# Patient Record
Sex: FEMALE | Race: WHITE | Employment: STUDENT | URBAN - METROPOLITAN AREA
[De-identification: names, ages, dates, MRNs, and addresses within clinical notes are randomized per-mention and may not be internally consistent; named-entity substitution may affect disease eponyms.]

---

## 2017-01-06 ENCOUNTER — ALLIED HEALTH/NURSE VISIT (OUTPATIENT)
Dept: NURSING | Facility: CLINIC | Age: 22
End: 2017-01-06
Payer: COMMERCIAL

## 2017-01-06 DIAGNOSIS — Z11.1 SCREENING EXAMINATION FOR PULMONARY TUBERCULOSIS: Primary | ICD-10-CM

## 2017-01-06 PROCEDURE — 86580 TB INTRADERMAL TEST: CPT

## 2017-01-06 NOTE — NURSING NOTE
Patient here for nurse only appointment for sumanth. Needs to be certified as an . She will return Monday for results. TALIA Forte LPN

## 2017-01-09 ENCOUNTER — ALLIED HEALTH/NURSE VISIT (OUTPATIENT)
Dept: NURSING | Facility: CLINIC | Age: 22
End: 2017-01-09
Payer: COMMERCIAL

## 2017-01-09 DIAGNOSIS — Z11.1 VISIT FOR MANTOUX TEST: Primary | ICD-10-CM

## 2017-01-09 LAB
PPDINDURATION: NORMAL MM (ref 0–5)
PPDREDNESS: NORMAL MM

## 2017-01-09 PROCEDURE — 99207 ZZC NO CHARGE NURSE ONLY: CPT

## 2017-06-22 ENCOUNTER — OFFICE VISIT (OUTPATIENT)
Dept: FAMILY MEDICINE | Facility: CLINIC | Age: 22
End: 2017-06-22
Payer: COMMERCIAL

## 2017-06-22 VITALS
DIASTOLIC BLOOD PRESSURE: 58 MMHG | SYSTOLIC BLOOD PRESSURE: 110 MMHG | HEART RATE: 57 BPM | BODY MASS INDEX: 20.16 KG/M2 | TEMPERATURE: 99 F | OXYGEN SATURATION: 98 % | WEIGHT: 121 LBS | HEIGHT: 65 IN

## 2017-06-22 DIAGNOSIS — Z02.9 ADMINISTRATIVE ENCOUNTER: Primary | ICD-10-CM

## 2017-06-22 DIAGNOSIS — Z23 NEED FOR VACCINATION: ICD-10-CM

## 2017-06-22 DIAGNOSIS — Z11.3 SCREEN FOR STD (SEXUALLY TRANSMITTED DISEASE): ICD-10-CM

## 2017-06-22 PROCEDURE — 36415 COLL VENOUS BLD VENIPUNCTURE: CPT | Performed by: INTERNAL MEDICINE

## 2017-06-22 PROCEDURE — 90471 IMMUNIZATION ADMIN: CPT | Performed by: INTERNAL MEDICINE

## 2017-06-22 PROCEDURE — 87491 CHLMYD TRACH DNA AMP PROBE: CPT | Performed by: INTERNAL MEDICINE

## 2017-06-22 PROCEDURE — 87389 HIV-1 AG W/HIV-1&-2 AB AG IA: CPT | Performed by: INTERNAL MEDICINE

## 2017-06-22 PROCEDURE — 90715 TDAP VACCINE 7 YRS/> IM: CPT | Performed by: INTERNAL MEDICINE

## 2017-06-22 PROCEDURE — 87591 N.GONORRHOEAE DNA AMP PROB: CPT | Performed by: INTERNAL MEDICINE

## 2017-06-22 PROCEDURE — 99212 OFFICE O/P EST SF 10 MIN: CPT | Mod: 25 | Performed by: INTERNAL MEDICINE

## 2017-06-22 NOTE — PROGRESS NOTES
"  SUBJECTIVE:                                                    Clakr Crawford is a 22 year old female who presents to clinic today for the following health issues:      Concern - Pt is here to have forms filled out; she is volunteering at a Camp this summer.      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  She needs physical form filled out.      Would also like to get STD screening and update Tdap.      She denies fevers, chills, headache, SOB, chest pain, GI concerns, joint problems, rashes.     Her anxiety is well controlled on sertraline 100mg daily. She sees a psychiatrist.     Reviewed and updated as needed this visit by clinical staff  Tobacco  Allergies  Meds  Med Hx  Surg Hx  Fam Hx  Soc Hx      Reviewed and updated as needed this visit by Provider  Tobacco  Allergies  Meds  Med Hx  Surg Hx  Fam Hx  Soc Hx        ROS:  Const, neuro, cv, pulm, gi, msk, and skin reviewed. Otherwise negative unless noted above.     OBJECTIVE:                                                    /58 (BP Location: Right arm, Patient Position: Chair, Cuff Size: Adult Regular)  Pulse 57  Temp 99  F (37.2  C) (Tympanic)  Ht 5' 4.5\" (1.638 m)  Wt 121 lb (54.9 kg)  LMP 06/08/2017  SpO2 98%  BMI 20.45 kg/m2  Body mass index is 20.45 kg/(m^2).    Gen: well appearing, pleasant young woman, no distress  HEENT: PERRL, sclera nonicteric, MMM  Neck: supple, no LAD  Pulm: breathing comfortably, CTAB, no wheezes or rales  CV: RRR, normal S1 and S2, no murmurs  Abd: BS present, soft, nontender, nondistended  Ext: 2+ distal pulses, no LE edema          ASSESSMENT/PLAN:                                                        1. Administrative encounter  Reviewed health history and patient current health. 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 Form filled out.     2. Screen for STD (sexually transmitted disease)  - NEISSERIA " GONORRHOEA PCR  - CHLAMYDIA TRACHOMATIS PCR  - HIV Antigen Antibody Combo    3. Need for vaccination  - TDAP VACCINE (ADACEL) [97934.002]  - 1st  Administration  [[id number]]    F/U as needed for persistent or worsening symptoms.       Caro Ríos MD  [facility]

## 2017-06-22 NOTE — MR AVS SNAPSHOT
"              After Visit Summary   6/22/2017    Clark Crawford    MRN: [id number]           Patient Information     Date Of Birth          1995        Visit Information        Provider Department      6/22/2017 11:00 AM Caro Ríos MD [facility] [place]        Today's Diagnoses     Administrative encounter    -  1    Screen for STD (sexually transmitted disease)        Need for vaccination           Follow-ups after your visit        Who to contact     If you have questions or need follow up information about today's clinic visit or your schedule please contact [facility] [place] directly at [phone].  Normal or non-critical lab and imaging results will be communicated to you by MyChart, letter or phone within 4 business days after the clinic has received the results. If you do not hear from us within 7 days, please contact the clinic through WeLikehart or phone. If you have a critical or abnormal lab result, we will notify you by phone as soon as possible.  Submit refill requests through [employer] or call your pharmacy and they will forward the refill request to us. Please allow 3 business days for your refill to be completed.          Additional Information About Your Visit        MyChart Information     [employer] gives you secure access to your electronic health record. If you see a primary care provider, you can also send messages to your care team and make appointments. If you have questions, please call your primary care clinic.  If you do not have a primary care provider, please call [phone] and they will assist you.        Care EveryWhere ID     This is your Care EveryWhere ID. This could be used by other organizations to access your [facility] medical records  [id number]        Your Vitals Were     Pulse Temperature Height Last Period Pulse Oximetry BMI (Body Mass Index)    57 99  F (37.2  C) (Tympanic) 5' 4.5\" (1.638 m) 06/08/2017 98% 20.45 kg/m2       Blood Pressure " from Last 3 Encounters:   06/22/17 110/58   11/23/16 108/57   08/15/16 100/66    Weight from Last 3 Encounters:   06/22/17 121 lb (54.9 kg)   11/23/16 118 lb 3.2 oz (53.6 kg)   08/10/16 116 lb 9.6 oz (52.9 kg)              We Performed the Following     1st  Administration  [[id number]]     CHLAMYDIA TRACHOMATIS PCR     HIV Antigen Antibody Combo     NEISSERIA GONORRHOEA PCR     TDAP VACCINE (ADACEL) [98381.002]          Today's Medication Changes          These changes are accurate as of: 6/22/17 11:44 AM.  If you have any questions, ask your nurse or doctor.               Stop taking these medicines if you haven't already. Please contact your care team if you have questions.     norgestim-eth estrad triphasic 0.18/0.215/0.25 MG-25 MCG per tablet   Commonly known as:  ORTHO TRI-CYCLEN LO   Stopped by:  Caro Ríos MD                    Primary Care Provider Office Phone # Fax #    Caro Ríos [phone]       [facility] [address] [place] [address] [address]  [place] [address]        Equal Access to Services     [employer] [facility] [facility]: Hadii aad ku hadasho Soomaali, waaxda luqadaha, qaybta kaalmada adeegyada, waxay idiin haycesarn odalis bird. So [facility] [phone].    ATENCIÓN: Si habla español, tiene a khan disposición servicios gratuitos de asistencia lingüística. Llame al [phone].    We comply with applicable federal civil rights laws and [place] laws. We do not discriminate on the basis of race, color, national origin, age, disability sex, sexual orientation or gender identity.            Thank you!     [facility]. Our goal is always to provide you with excellent care. Hearing back from our patients is one way we can continue to improve our services. Please take a few minutes to complete the written survey that you may receive in the mail after your visit with us. Thank you!             Your Updated Medication List -  Protect others around you: Learn how to safely use, store and throw away your medicines at www.disposemymeds.org.          This list is accurate as of: 6/22/17 11:44 AM.  Always use your most recent med list.                   Brand Name Dispense Instructions for use Diagnosis    ketoconazole 2 % cream    NIZORAL    60 g    Apply BID to affected area for 2-3 weeks    Tinea versicolor       levonorgestrel-ethinyl estradiol 0.1-20 MG-MCG per tablet    AVIANE,ALESSE,LESSINA    84 tablet    Take 1 tablet by mouth daily    Uses birth control       ZOLOFT PO      Take 100 mg by mouth daily

## 2017-06-22 NOTE — NURSING NOTE
"Chief Complaint   Patient presents with     Forms       Initial /58 (BP Location: Right arm, Patient Position: Chair, Cuff Size: Adult Regular)  Pulse 57  Temp 99  F (37.2  C) (Tympanic)  Ht 5' 4.5\" (1.638 m)  Wt 121 lb (54.9 kg)  LMP 06/08/2017  SpO2 98%  BMI 20.45 kg/m2 Estimated body mass index is 20.45 kg/(m^2) as calculated from the following:    Height as of this encounter: 5' 4.5\" (1.638 m).    Weight as of this encounter: 121 lb (54.9 kg).  Medication Reconciliation: complete  "

## 2017-06-23 LAB
C TRACH DNA SPEC QL NAA+PROBE: NORMAL
HIV 1+2 AB+HIV1 P24 AG SERPL QL IA: NORMAL
N GONORRHOEA DNA SPEC QL NAA+PROBE: NORMAL
SPECIMEN SOURCE: NORMAL
SPECIMEN SOURCE: NORMAL

## 2017-06-26 ENCOUNTER — ALLIED HEALTH/NURSE VISIT (OUTPATIENT)
Dept: NURSING | Facility: CLINIC | Age: 22
End: 2017-06-26
Payer: COMMERCIAL

## 2017-06-26 DIAGNOSIS — Z11.1 VISIT FOR MANTOUX TEST: Primary | ICD-10-CM

## 2017-06-26 PROCEDURE — 86580 TB INTRADERMAL TEST: CPT

## 2017-06-28 ENCOUNTER — ALLIED HEALTH/NURSE VISIT (OUTPATIENT)
Dept: NURSING | Facility: CLINIC | Age: 22
End: 2017-06-28
Payer: COMMERCIAL

## 2017-06-28 DIAGNOSIS — Z11.1 SCREENING EXAMINATION FOR PULMONARY TUBERCULOSIS: Primary | ICD-10-CM

## 2017-06-28 LAB
PPDINDURATION: 0 MM (ref 0–5)
PPDREDNESS: NORMAL MM

## 2017-06-28 PROCEDURE — 99207 ZZC NO CHARGE NURSE ONLY: CPT

## 2017-06-28 NOTE — PROGRESS NOTES
[place]ant[place] results NEGATIVE. No induration.  No swelling.  No redness.       Brittany Galeas, CMA

## 2017-06-28 NOTE — MR AVS SNAPSHOT
After Visit Summary   6/28/2017    Clark Crawford    MRN: [id number]           Patient Information     Date Of Birth          1995        Visit Information        Provider Department      6/28/2017 3:45 PM EC MA/LPN [facility] [place]        Today's Diagnoses     Screening examination for pulmonary tuberculosis    -  1       Follow-ups after your visit        Who to contact     If you have questions or need follow up information about today's clinic visit or your schedule please contact [facility] [place] directly at [phone].  Normal or non-critical lab and imaging results will be communicated to you by Wanderablehart, letter or phone within 4 business days after the clinic has received the results. If you do not hear from us within 7 days, please contact the clinic through [employer] or phone. If you have a critical or abnormal lab result, we will notify you by phone as soon as possible.  Submit refill requests through [employer] or call your pharmacy and they will forward the refill request to us. Please allow 3 business days for your refill to be completed.          Additional Information About Your Visit        MyChart Information     [employer] gives you secure access to your electronic health record. If you see a primary care provider, you can also send messages to your care team and make appointments. If you have questions, please call your primary care clinic.  If you do not have a primary care provider, please call [phone] and they will assist you.        Care EveryWhere ID     This is your Care EveryWhere ID. This could be used by other organizations to access your [facility] medical records  [id number]        Your Vitals Were     Last Period                   06/08/2017            Blood Pressure from Last 3 Encounters:   06/22/17 110/58   11/23/16 108/57   08/15/16 100/66    Weight from Last 3 Encounters:   06/22/17 121 lb (54.9 kg)   11/23/16 118 lb 3.2 oz (53.6 kg)    08/10/16 116 lb 9.6 oz (52.9 kg)              Today, you had the following     No orders found for display       Primary Care Provider Office Phone # Fax #    Caro Ríos [phone]       [facility] [address] [place] [address] DR  [place] [address]        Equal Access to Services     [facility]: Hadii aad ku hadasho Soomaali, waaxda luqadaha, qaybta kaalmada adeegyada, waxay idiin hayaan adeeg kharash la'aan ah. So [facility] [phone].    ATENCIÓN: Si habla español, tiene a khan disposición servicios gratuitos de asistencia lingüística. Llame al [phone].    We comply with applicable federal civil rights laws and [place] laws. We do not discriminate on the basis of race, color, national origin, age, disability sex, sexual orientation or gender identity.            Thank you!     Thank you for choosing [facility] [place]  for your care. Our goal is always to provide you with excellent care. Hearing back from our patients is one way we can continue to improve our services. Please take a few minutes to complete the written survey that you may receive in the mail after your visit with us. Thank you!             Your Updated Medication List - Protect others around you: Learn how to safely use, store and throw away your medicines at www.disposemymeds.org.          This list is accurate as of: 6/28/17  4:37 PM.  Always use your most recent med list.                   Brand Name Dispense Instructions for use Diagnosis    ketoconazole 2 % cream    NIZORAL    60 g    Apply BID to affected area for 2-3 weeks    Tinea versicolor       levonorgestrel-ethinyl estradiol 0.1-20 MG-MCG per tablet    AVIANE,ALESSE,LESSINA    84 tablet    Take 1 tablet by mouth daily    Uses birth control       ZOLOFT PO      Take 100 mg by mouth daily

## 2017-07-05 ENCOUNTER — TELEPHONE (OUTPATIENT)
Dept: FAMILY MEDICINE | Facility: CLINIC | Age: 22
End: 2017-07-05

## 2017-07-05 NOTE — TELEPHONE ENCOUNTER
Patients mother walked into clinic and was given copy of immunization record by  staff.   Lydia Anderson RN   [facility] - Triage

## 2017-07-05 NOTE — TELEPHONE ENCOUNTER
Reason for Call:  Other LETTER    Detailed comments: PT NEEDS LETTER STATING THAT SHE HAS HAD THE CHICKEN PX AND DOES NOT NEED THE VACCINATION. PT WILL  LATER THIS AFTERNOON.    Phone Number Patient can be reached at: Home number on file [phone] (home)    Best Time: ANYTIME    Can we leave a detailed message on this number? YES    Call taken on 7/5/2017 at 11:06 AM by Denisha Platt

## 2017-07-05 NOTE — TELEPHONE ENCOUNTER
Left non detailed message for patient to return call. What does patient need this for? Unfortunately, per chart review it only states that she has had the chicken pox but she was not a patient with [facility] when she had them so we do not know when. If she needs this documentation for school she will likely either need titers drawn or to be re-immunized.   Lydia Anderson RN   [facility] - Triage

## 2017-10-05 DIAGNOSIS — B36.0 TINEA VERSICOLOR: ICD-10-CM

## 2017-10-05 NOTE — TELEPHONE ENCOUNTER
Ketoconazole (NIZOLRAL) 2 % cream      Last Written Prescription Date: 11/23/2016  Last Fill Quantity: 60 g,  # refills: 2   Last Office Visit with [facility], [facility] or [facility] prescribing provider: 06/22/2017    Taylor Muñoz MA

## 2017-10-06 RX ORDER — KETOCONAZOLE 20 MG/G
CREAM TOPICAL
Qty: 60 G | Refills: 2 | Status: SHIPPED | OUTPATIENT
Start: 2017-10-06 | End: 2018-04-19

## 2017-10-06 NOTE — TELEPHONE ENCOUNTER
Routing refill request to provider for review/approval because:  Medication ordered for 2-3 weeks.  Please advise if to continue with cream  Katie Enriquez RN - Triage  [facility]

## 2017-11-28 DIAGNOSIS — Z78.9 USES BIRTH CONTROL: ICD-10-CM

## 2017-11-29 RX ORDER — LEVONORGESTREL AND ETHINYL ESTRADIOL 0.1-0.02MG
KIT ORAL
Qty: 84 TABLET | Refills: 1 | Status: SHIPPED | OUTPATIENT
Start: 2017-11-29 | End: 2018-04-25

## 2017-11-29 NOTE — TELEPHONE ENCOUNTER
Birth control      Last Written Prescription Date: 12/20/16  Last Fill Quantity: 84,  # refills: 3   [facility] Office Visit with [facility] or [facility] prescribing provider: 6/22/17                                           Requested Prescriptions   Pending Prescriptions Disp Refills     FALMINA 0.1-20 MG-MCG per tablet [Pharmacy Med Name: FALMINA 0.1-20MG-MCG TABS] 84 tablet 3     Sig: TAKE ONE TABLET BY MOUTH EVERY DAY    Contraceptives Protocol Passed    11/28/2017  4:45 PM       Passed - Patient is not a current smoker if age is 35 or older       Passed - Recent or future visit with authorizing provider's specialty    Patient had office visit in the last year or has a visit in the next 30 days with authorizing provider.  See chart review.              Passed - No active pregnancy on record       Passed - No positive pregnancy test in past 12 months        PHQ-9 SCORE 10/14/2009   Total Score 1     No flowsheet data found.

## 2017-12-07 ENCOUNTER — TELEPHONE (OUTPATIENT)
Dept: FAMILY MEDICINE | Facility: CLINIC | Age: 22
End: 2017-12-07

## 2017-12-07 NOTE — TELEPHONE ENCOUNTER
Reason for Call:  Other appointment    Detailed comments: Pt would like to schedule a consult & IUD appt at [facility]    Phone Number Patient can be reached at: Cell number on file:    Telephone Information:   Mobile [phone]       Best Time: any    Can we leave a detailed message on this number? YES    Call taken on 12/7/2017 at 3:56 PM by Lillie Cheney

## 2017-12-14 ENCOUNTER — OFFICE VISIT (OUTPATIENT)
Dept: FAMILY MEDICINE | Facility: CLINIC | Age: 22
End: 2017-12-14
Payer: COMMERCIAL

## 2017-12-14 VITALS
TEMPERATURE: 98.8 F | WEIGHT: 123 LBS | BODY MASS INDEX: 20.49 KG/M2 | HEART RATE: 66 BPM | HEIGHT: 65 IN | OXYGEN SATURATION: 98 % | SYSTOLIC BLOOD PRESSURE: 100 MMHG | DIASTOLIC BLOOD PRESSURE: 66 MMHG

## 2017-12-14 DIAGNOSIS — Z23 NEED FOR PROPHYLACTIC VACCINATION AND INOCULATION AGAINST INFLUENZA: ICD-10-CM

## 2017-12-14 DIAGNOSIS — Z30.09 BIRTH CONTROL COUNSELING: Primary | ICD-10-CM

## 2017-12-14 PROCEDURE — 87491 CHLMYD TRACH DNA AMP PROBE: CPT | Performed by: FAMILY MEDICINE

## 2017-12-14 PROCEDURE — 87591 N.GONORRHOEAE DNA AMP PROB: CPT | Performed by: FAMILY MEDICINE

## 2017-12-14 PROCEDURE — 99214 OFFICE O/P EST MOD 30 MIN: CPT | Performed by: FAMILY MEDICINE

## 2017-12-14 NOTE — NURSING NOTE
"Chief Complaint   Patient presents with     IUD Consult       Initial /66 (Cuff Size: Adult Regular)  Pulse 66  Temp 98.8  F (37.1  C) (Tympanic)  Ht 5' 4.5\" (1.638 m)  Wt 123 lb (55.8 kg)  LMP 11/20/2017 (Approximate)  SpO2 98%  BMI 20.79 kg/m2 Estimated body mass index is 20.79 kg/(m^2) as calculated from the following:    Height as of this encounter: 5' 4.5\" (1.638 m).    Weight as of this encounter: 123 lb (55.8 kg).  Medication Reconciliation: complete   Yoselyn Cruz, CMA    "

## 2017-12-14 NOTE — MR AVS SNAPSHOT
After Visit Summary   12/14/2017    Clark Crawford    MRN: [id number]           Patient Information     Date Of Birth          1995        Visit Information        Provider Department      12/14/2017 10:40 AM Jonathan Pruitt MD [facility] [place]        Today's Diagnoses     Birth control counseling    -  1    Need for prophylactic vaccination and inoculation against influenza           Follow-ups after your visit        Follow-up notes from your care team     Return in about 1 week (around 12/21/2017) for IUD placement .      Who to contact     If you have questions or need follow up information about today's clinic visit or your schedule please contact [facility] directly at [phone].  Normal or non-critical lab and imaging results will be communicated to you by SmartMenuCardhart, letter or phone within 4 business days after the clinic has received the results. If you do not hear from us within 7 days, please contact the clinic through SmartMenuCardhart or phone. If you have a critical or abnormal lab result, we will notify you by phone as soon as possible.  Submit refill requests through [employer] or call your pharmacy and they will forward the refill request to us. Please allow 3 business days for your refill to be completed.          Additional Information About Your Visit        MyChart Information     [employer] gives you secure access to your electronic health record. If you see a primary care provider, you can also send messages to your care team and make appointments. If you have questions, please call your primary care clinic.  If you do not have a primary care provider, please call [phone] and they will assist you.        Care EveryWhere ID     This is your Care EveryWhere ID. This could be used by other organizations to access your [facility] medical records  [id number]        Your Vitals Were     Pulse Temperature Height Last Period Pulse Oximetry BMI (Body Mass Index)    66  "98.8  F (37.1  C) (Tympanic) 5' 4.5\" (1.638 m) 11/20/2017 (Approximate) 98% 20.79 kg/m2       Blood Pressure from Last 3 Encounters:   12/14/17 100/66   06/22/17 110/58   11/23/16 108/57    Weight from Last 3 Encounters:   12/14/17 123 lb (55.8 kg)   06/22/17 121 lb (54.9 kg)   11/23/16 118 lb 3.2 oz (53.6 kg)              We Performed the Following     Chlamydia trachomatis PCR     Neisseria gonorrhoeae PCR        Primary Care Provider Office Phone # Fax #    Caro Ríos -[phone]       [address]  [place] [address]        Equal Access to Services     [facility]: Hadii nabor vazquez hadasho Soomaali, waaxda luqadaha, qaybta kaalmada adeegyada, yesenia ordaz . So [facility] [phone].    ATENCIÓN: Si habla español, tiene a khan disposición servicios gratuitos de asistencia lingüística. [facility] [phone].    We comply with applicable federal civil rights laws and [place] laws. We do not discriminate on the basis of race, color, national origin, age, disability, sex, sexual orientation, or gender identity.            Thank you!     Thank you for choosing [facility] [place]  for your care. Our goal is always to provide you with excellent care. Hearing back from our patients is one way we can continue to improve our services. Please take a few minutes to complete the written survey that you may receive in the mail after your visit with us. Thank you!             Your Updated Medication List - Protect others around you: Learn how to safely use, store and throw away your medicines at www.disposemymeds.org.          This list is accurate as of: 12/14/17 11:42 AM.  Always use your most recent med list.                   Brand Name Dispense Instructions for use Diagnosis    FALMINA 0.1-20 MG-MCG per tablet   Generic drug:  levonorgestrel-ethinyl estradiol     84 tablet    TAKE ONE TABLET BY MOUTH EVERY DAY    Uses birth control       ketoconazole 2 " % cream    NIZORAL    60 g    APPLY TO AFFECTED AREA(S) TWO TIMES A DAY FOR 2 TO 3 WEEKS    Tinea versicolor       ZOLOFT PO      Take 100 mg by mouth daily

## 2017-12-14 NOTE — PROGRESS NOTES
SUBJECTIVE:   Clark Crawford is a 22 year old female who presents to clinic today for the following health issues:      IUD Consult       Description (location/character/radiation): IUD Consult         Problem list and histories reviewed & adjusted, as indicated.  Additional history: as documented    Patient Active Problem List   Diagnosis     ZEB (generalized anxiety disorder)     Eating disorder     CARDIOVASCULAR SCREENING; LDL GOAL LESS THAN 160     Past Surgical History:   Procedure Laterality Date     NO HISTORY OF SURGERY         Social History   Substance Use Topics     Smoking status: Never Smoker     Smokeless tobacco: Never Used     Alcohol use 1.8 oz/week     3 Standard drinks or equivalent per week      Comment: socially     Family History   Problem Relation Age of Onset     Anesthesia Reaction Mother      nausea     Pancreatic Cancer Mother 47     alive     Anesthesia Reaction Maternal Grandmother      nausea     Depression Maternal Grandmother      past, not currently     Breast Cancer No family hx of      Colon Cancer No family hx of          Current Outpatient Prescriptions   Medication Sig Dispense Refill     ketoconazole (NIZORAL) 2 % cream APPLY TO AFFECTED AREA(S) TWO TIMES A DAY FOR 2 TO 3 WEEKS 60 g 2     Sertraline HCl (ZOLOFT PO) Take 100 mg by mouth daily       FALMINA 0.1-20 MG-MCG per tablet TAKE ONE TABLET BY MOUTH EVERY DAY (Patient not taking: Reported on 12/14/2017) 84 tablet 1     No Known Allergies  Recent Labs   Lab Test  08/10/16   1356   ALT  20   CR  0.73   GFRESTIMATED  >90  Non  GFR Calc     GFRESTBLACK  >90   GFR Calc     POTASSIUM  3.8      BP Readings from Last 3 Encounters:   12/14/17 100/66   06/22/17 110/58   11/23/16 108/57    Wt Readings from Last 3 Encounters:   12/14/17 123 lb (55.8 kg)   06/22/17 121 lb (54.9 kg)   11/23/16 118 lb 3.2 oz (53.6 kg)                   Reviewed and updated as needed this visit by clinical staff    "  Reviewed and updated as needed this visit by Provider         ROS:  Constitutional, HEENT, cardiovascular, pulmonary, gi and gu systems are negative, except as otherwise noted.      OBJECTIVE:   /66 (Cuff Size: Adult Regular)  Pulse 66  Temp 98.8  F (37.1  C) (Tympanic)  Ht 5' 4.5\" (1.638 m)  Wt 123 lb (55.8 kg)  LMP 11/20/2017 (Approximate)  SpO2 98%  BMI 20.79 kg/m2  Body mass index is 20.79 kg/(m^2).  GENERAL: healthy, alert and no distress  NECK: no adenopathy, no asymmetry, masses, or scars and thyroid normal to palpation  RESP: lungs clear to auscultation - no rales, rhonchi or wheezes  CV: regular rate and rhythm, normal S1 S2, no S3 or S4, no murmur, click or rub, no peripheral edema and peripheral pulses strong  ABDOMEN: soft, nontender, no hepatosplenomegaly, no masses and bowel sounds normal  MS: no gross musculoskeletal defects noted, no edema        ASSESSMENT/PLAN:   Clark was seen today for iud consult.    Diagnoses and all orders for this visit:    Birth control counseling  -     Neisseria gonorrhoeae PCR  -     Chlamydia trachomatis PCR    Need for prophylactic vaccination and inoculation against influenza      Has extended conversation about birth control device including IUD and implant, reviewed through advantage and disadvantage of all devices, pt elected Mirena,   Will have her to schedule IUD procedure next week after GC/Chlam test results returned     A total time of 27 minutes was spent with the patient today. Of this time More than 50% was spent counseling and coordinating of care of the above concerns.    FUTURE APPOINTMENTS:       - Follow-up visit in 1 week for IUD placement     Jonathan Pruitt MD  [facility]  "

## 2017-12-15 LAB
C TRACH DNA SPEC QL NAA+PROBE: NEGATIVE
N GONORRHOEA DNA SPEC QL NAA+PROBE: NEGATIVE
SPECIMEN SOURCE: NORMAL
SPECIMEN SOURCE: NORMAL

## 2017-12-22 ENCOUNTER — OFFICE VISIT (OUTPATIENT)
Dept: FAMILY MEDICINE | Facility: CLINIC | Age: 22
End: 2017-12-22
Payer: COMMERCIAL

## 2017-12-22 VITALS
WEIGHT: 120 LBS | HEART RATE: 84 BPM | SYSTOLIC BLOOD PRESSURE: 92 MMHG | DIASTOLIC BLOOD PRESSURE: 60 MMHG | BODY MASS INDEX: 20.28 KG/M2 | TEMPERATURE: 97.6 F

## 2017-12-22 DIAGNOSIS — Z30.430 ENCOUNTER FOR INSERTION OF INTRAUTERINE CONTRACEPTIVE DEVICE (IUD): ICD-10-CM

## 2017-12-22 DIAGNOSIS — Z30.430 ENCOUNTER FOR IUD INSERTION: Primary | ICD-10-CM

## 2017-12-22 LAB — BETA HCG QUAL IFA URINE: NEGATIVE

## 2017-12-22 PROCEDURE — 84703 CHORIONIC GONADOTROPIN ASSAY: CPT | Performed by: FAMILY MEDICINE

## 2017-12-22 PROCEDURE — 58300 INSERT INTRAUTERINE DEVICE: CPT | Performed by: FAMILY MEDICINE

## 2017-12-22 NOTE — PROGRESS NOTES
SUBJECTIVE:   Clark Crawford is a 22 year old female who presents to clinic today for the following health issues:      Concern - Pt is here to have an IUD placed.     Problem list and histories reviewed & adjusted, as indicated.  Additional history: as documented    Patient Active Problem List   Diagnosis     ZEB (generalized anxiety disorder)     Eating disorder     CARDIOVASCULAR SCREENING; LDL GOAL LESS THAN 160     Past Surgical History:   Procedure Laterality Date     NO HISTORY OF SURGERY         Social History   Substance Use Topics     Smoking status: Never Smoker     Smokeless tobacco: Never Used     Alcohol use 1.8 oz/week     3 Standard drinks or equivalent per week      Comment: socially     Family History   Problem Relation Age of Onset     Anesthesia Reaction Mother      nausea     Pancreatic Cancer Mother 47     alive     Anesthesia Reaction Maternal Grandmother      nausea     Depression Maternal Grandmother      past, not currently     Breast Cancer No family hx of      Colon Cancer No family hx of          Current Outpatient Prescriptions   Medication Sig Dispense Refill     levonorgestrel (MIRENA) 20 MCG/24HR IUD 1 each (20 mcg) by Intrauterine route once for 1 dose 1 each 0     ketoconazole (NIZORAL) 2 % cream APPLY TO AFFECTED AREA(S) TWO TIMES A DAY FOR 2 TO 3 WEEKS 60 g 2     Sertraline HCl (ZOLOFT PO) Take 100 mg by mouth daily       FALMINA 0.1-20 MG-MCG per tablet TAKE ONE TABLET BY MOUTH EVERY DAY (Patient not taking: Reported on 12/14/2017) 84 tablet 1     No Known Allergies  Recent Labs   Lab Test  08/10/16   1356   ALT  20   CR  0.73   GFRESTIMATED  >90  Non  GFR Calc     GFRESTBLACK  >90   GFR Calc     POTASSIUM  3.8      BP Readings from Last 3 Encounters:   12/22/17 92/60   12/14/17 100/66   06/22/17 110/58    Wt Readings from Last 3 Encounters:   12/22/17 120 lb (54.4 kg)   12/14/17 123 lb (55.8 kg)   06/22/17 121 lb (54.9 kg)              Reviewed  and updated as needed this visit by clinical staff     Reviewed and updated as needed this visit by Provider         ROS:  Constitutional, HEENT, cardiovascular, pulmonary, gi and gu systems are negative, except as otherwise noted.      OBJECTIVE:   BP 92/60  Pulse 84  Temp 97.6  F (36.4  C) (Tympanic)  Wt 120 lb (54.4 kg)  LMP 12/16/2017  BMI 20.28 kg/m2  Body mass index is 20.28 kg/(m^2).  GENERAL: healthy, alert and no distress  NECK: no adenopathy, no asymmetry, masses, or scars and thyroid normal to palpation  RESP: lungs clear to auscultation - no rales, rhonchi or wheezes  CV: regular rate and rhythm, normal S1 S2, no S3 or S4, no murmur, click or rub, no peripheral edema and peripheral pulses strong  ABDOMEN: soft, nontender, no hepatosplenomegaly, no masses and bowel sounds normal  MS: no gross musculoskeletal defects noted, no edema        ASSESSMENT/PLAN:     Clark was seen today for contraception.    Diagnoses and all orders for this visit:    Encounter for IUD insertion  -     levonorgestrel (MIRENA) 20 MCG/24HR IUD; 1 each (20 mcg) by Intrauterine route once for 1 dose  -     INSERTION INTRAUTERINE DEVICE  -     Mirena    Encounter for insertion of intrauterine contraceptive device (IUD)    Other orders  -     Cancel: HCG tumor marker  -     Beta HCG qual IFA urine - [facility] and [facility]        IUD insertion note    Clark Crawford is a 22 year old female who presents today requesting placement of an intrauterine device.  PROCEDURE:  Type of IUD: Mirena  The patient has taken 600-800mg of Ibuprofen prior to the procedure.  She is placed in a dorsal lithotomy potion and a pelvic exam is performed to determine the position of the uterus.  The cervix is identified and cleaned with betadine three times.  A single tooth tenaculum is applied to the anterior lip of the cervix for stabilization.  The uterus is sounded to 6.0 cm and removed. (Target sound depth is 6.5 cm to 8.5 cm.)  The IUD insertion tube is  prepared to manufacturers recommendations and inserted into the uterus under sterile conditions to the sounding depth.   The arms of the IUD are then opened by withdrawing the insertion tube 2.0 cm while stabilizing the solid insertion edgar without difficulty.  The IUD string is then cut to 2.0 cm.    The patient tolerated this procedure without immediate complication.  The patient is to return or call immediately for any unexplained fever, abdominal or pelvic pain, excessive bleeding, possibility of pregnancy, foul-smelling discharge, sense that the IUD has been expelled.    Jonathan Pruitt

## 2017-12-22 NOTE — MR AVS SNAPSHOT
After Visit Summary   12/22/2017    Clark Crawford    MRN: [id number]           Patient Information     Date Of Birth          1995        Visit Information        Provider Department      12/22/2017 12:00 PM Jonathan Pruitt MD [facility] [place]        Today's Diagnoses     Encounter for IUD insertion    -  1    Encounter for insertion of intrauterine contraceptive device (IUD)           Follow-ups after your visit        Who to contact     If you have questions or need follow up information about today's clinic visit or your schedule please contact [facility] [place] directly at [phone].  Normal or non-critical lab and imaging results will be communicated to you by MyChart, letter or phone within 4 business days after the clinic has received the results. If you do not hear from us within 7 days, please contact the clinic through Kidblog[employer] or phone. If you have a critical or abnormal lab result, we will notify you by phone as soon as possible.  Submit refill requests through [employer]Te[employer] or call your pharmacy and they will forward the refill request to us. Please allow 3 business days for your refill to be completed.          Additional Information About Your Visit        MyChart Information     PurpleTeal gives you secure access to your electronic health record. If you see a primary care provider, you can also send messages to your care team and make appointments. If you have questions, please call your primary care clinic.  If you do not have a primary care provider, please call [phone] and they will assist you.        Care EveryWhere ID     This is your Care EveryWhere ID. This could be used by other organizations to access your [place] medical records  [id number]        Your Vitals Were     Pulse Temperature Last Period BMI (Body Mass Index)          84 97.6  F (36.4  C) (Tympanic) 12/16/2017 20.28 kg/m2         Blood Pressure from Last 3 Encounters:   12/22/17 92/60    12/14/17 100/66   06/22/17 110/58    Weight from Last 3 Encounters:   12/22/17 120 lb (54.4 kg)   12/14/17 123 lb (55.8 kg)   06/22/17 121 lb (54.9 kg)              We Performed the Following     Beta HCG qual IFA urine - [facility] and [facility]     INSERTION INTRAUTERINE DEVICE     Mirena          Today's Medication Changes          These changes are accurate as of: 12/22/17  1:49 PM.  If you have any questions, ask your nurse or doctor.               Start taking these medicines.        Dose/Directions    levonorgestrel 20 MCG/24HR IUD   Commonly known as:  MIRENA   Used for:  Encounter for IUD insertion   Started by:  Jonathan Pruitt MD        Dose:  1 each   1 each (20 mcg) by Intrauterine route once for 1 dose   Quantity:  1 each   Refills:  0            Where to get your medicines      Some of these will need a paper prescription and others can be bought over the counter.  Ask your nurse if you have questions.     You don't need a prescription for these medications     levonorgestrel 20 MCG/24HR IUD                Primary Care Provider Office Phone # Fax #    Caro Ríos [phone]       [address]  [place] [address]        Equal Access to Services     [facility]: Hadii aad ku hadasho Soomaali, waaxda luqadaha, qaybta kaalmada adeegyada, waxay tdin haycesarn odalis bird. So [facility] [phone].    ATENCIÓN: Si habla español, tiene a khan disposición servicios gratuitos de asistencia lingüística. Llame al [phone].    We comply with applicable federal civil rights laws and [place] laws. We do not discriminate on the basis of race, color, national origin, age, disability, sex, sexual orientation, or gender identity.            Thank you!     Thank you for choosing [facility] [place]  for your care. Our goal is always to provide you with excellent care. Hearing back from our patients is one way we can continue to improve our services. Please take a few  minutes to complete the written survey that you may receive in the mail after your visit with us. Thank you!             Your Updated Medication List - Protect others around you: Learn how to safely use, store and throw away your medicines at www.disposemymeds.org.          This list is accurate as of: 12/22/17  1:49 PM.  Always use your most recent med list.                   Brand Name Dispense Instructions for use Diagnosis    FALMINA 0.1-20 MG-MCG per tablet   Generic drug:  levonorgestrel-ethinyl estradiol     84 tablet    TAKE ONE TABLET BY MOUTH EVERY DAY    Uses birth control       ketoconazole 2 % cream    NIZORAL    60 g    APPLY TO AFFECTED AREA(S) TWO TIMES A DAY FOR 2 TO 3 WEEKS    Tinea versicolor       levonorgestrel 20 MCG/24HR IUD    MIRENA    1 each    1 each (20 mcg) by Intrauterine route once for 1 dose    Encounter for IUD insertion       ZOLOFT PO      Take 100 mg by mouth daily

## 2018-04-19 DIAGNOSIS — B36.0 TINEA VERSICOLOR: ICD-10-CM

## 2018-04-19 NOTE — TELEPHONE ENCOUNTER
"Requested Prescriptions   Pending Prescriptions Disp Refills     ketoconazole (NIZORAL) 2 % cream [Pharmacy Med Name: KETOCONAZOLE 2% CREA] 60 g 2    Last Written Prescription Date:  10/6/17  Last Fill Quantity: 60 g,  # refills: 2   Last office visit: 12/22/2017 with prescribing provider:  Dr. Pruitt   Future Office Visit:     Sig: APPLY TO AFFECTED AREA(S) TWO TIMES A DAY FOR 2 TO 3 WEEKS    Antifungal Agents Passed    4/19/2018  3:29 PM       Passed - Recent (12 mo) or future (30 days) visit within the authorizing provider's specialty    Patient had office visit in the last 12 months or has a visit in the next 30 days with authorizing provider or within the authorizing provider's specialty.  See \"Patient Info\" tab in inbasket, or \"Choose Columns\" in Meds & Orders section of the refill encounter.           Passed - Not Fluconazole or Terconazole     If oral Fluconazole or Terconazole, may refill if indicated in progress notes.           Routing refill request to provider for review/approval because:  Ordered for 2-3 weeks in October, advise if should continue medication.   Kira Powers RN   [place] Triage             "

## 2018-04-20 RX ORDER — KETOCONAZOLE 20 MG/G
CREAM TOPICAL
Qty: 60 G | Refills: 2 | Status: SHIPPED | OUTPATIENT
Start: 2018-04-20 | End: 2021-04-06

## 2018-04-24 ENCOUNTER — TELEPHONE (OUTPATIENT)
Dept: FAMILY MEDICINE | Facility: CLINIC | Age: 23
End: 2018-04-24

## 2018-04-24 NOTE — TELEPHONE ENCOUNTER
Patient complaining about pain above her hip  Patient is a runner and has had pain for a few days that is getting worse and making it hard to walk  No injury that she is aware of  Has always had tight hips  States that the other day she woke up and went on a run  Pain in her hip after run was worse than usual  Took a few days off running and then Sunday she went on a run again  Seemed OK for the first 1/2 hour or so but then got wore  Pain is no longer just in the hip, but is also in the muscle above the glut  Walking with a limp now.  I did recommend that patient be seen at the walk in ortho clinic at the [facility] for her current problem  Patient agrees with plan.  Will check with her insurance to determine if covered.    Patient had been following at [facility] and then moved out of state to go to college.  Mother moved to [place] and patient was seen at [facility] when she came home from school   Would like to resume care at [facility].  Will schedule appointment here when next due for Office visit.  Dee Moody RN

## 2018-04-25 ENCOUNTER — OFFICE VISIT (OUTPATIENT)
Dept: FAMILY MEDICINE | Facility: CLINIC | Age: 23
End: 2018-04-25
Payer: COMMERCIAL

## 2018-04-25 ENCOUNTER — OFFICE VISIT (OUTPATIENT)
Dept: ORTHOPEDICS | Facility: CLINIC | Age: 23
End: 2018-04-25

## 2018-04-25 ENCOUNTER — TELEPHONE (OUTPATIENT)
Dept: FAMILY MEDICINE | Facility: CLINIC | Age: 23
End: 2018-04-25

## 2018-04-25 VITALS
DIASTOLIC BLOOD PRESSURE: 55 MMHG | HEART RATE: 66 BPM | WEIGHT: 123 LBS | SYSTOLIC BLOOD PRESSURE: 92 MMHG | BODY MASS INDEX: 20.49 KG/M2 | HEIGHT: 65 IN

## 2018-04-25 VITALS
DIASTOLIC BLOOD PRESSURE: 55 MMHG | HEIGHT: 65 IN | OXYGEN SATURATION: 98 % | BODY MASS INDEX: 20.49 KG/M2 | WEIGHT: 123 LBS | TEMPERATURE: 99.1 F | HEART RATE: 66 BPM | RESPIRATION RATE: 14 BRPM | SYSTOLIC BLOOD PRESSURE: 92 MMHG

## 2018-04-25 DIAGNOSIS — N92.1 MENORRHAGIA WITH IRREGULAR CYCLE: Primary | ICD-10-CM

## 2018-04-25 DIAGNOSIS — M53.3 SACROILIAC JOINT DYSFUNCTION: Primary | ICD-10-CM

## 2018-04-25 DIAGNOSIS — Z97.5 IUD (INTRAUTERINE DEVICE) IN PLACE: ICD-10-CM

## 2018-04-25 PROCEDURE — 99213 OFFICE O/P EST LOW 20 MIN: CPT | Performed by: FAMILY MEDICINE

## 2018-04-25 ASSESSMENT — ANXIETY QUESTIONNAIRES
IF YOU CHECKED OFF ANY PROBLEMS ON THIS QUESTIONNAIRE, HOW DIFFICULT HAVE THESE PROBLEMS MADE IT FOR YOU TO DO YOUR WORK, TAKE CARE OF THINGS AT HOME, OR GET ALONG WITH OTHER PEOPLE: NOT DIFFICULT AT ALL
3. WORRYING TOO MUCH ABOUT DIFFERENT THINGS: NOT AT ALL
GAD7 TOTAL SCORE: 3
2. NOT BEING ABLE TO STOP OR CONTROL WORRYING: NOT AT ALL
5. BEING SO RESTLESS THAT IT IS HARD TO SIT STILL: NOT AT ALL
1. FEELING NERVOUS, ANXIOUS, OR ON EDGE: SEVERAL DAYS
6. BECOMING EASILY ANNOYED OR IRRITABLE: NOT AT ALL
7. FEELING AFRAID AS IF SOMETHING AWFUL MIGHT HAPPEN: SEVERAL DAYS

## 2018-04-25 ASSESSMENT — PATIENT HEALTH QUESTIONNAIRE - PHQ9: 5. POOR APPETITE OR OVEREATING: SEVERAL DAYS

## 2018-04-25 NOTE — LETTER
"4/25/2018       RE: Clark Crawford  [address] [place] [address]     Dear Colleague,    Thank you for referring your patient, Clark Crawford, to the [facility]TH[facility]ALK [facility] CLINIC at [facility]. Please see a copy of my visit note below.          SPORTS & ORTHOPEDIC WALK-IN VISIT 4/25/2018    Primary Care Physician: Dr. Ríos  Woke up last week Thursday. Woke up with \"tightness\". Does run every other day. Took a few days off running, started to feel better. Runs about 29 miles a week  Reason for visit:     What part of your body is injured / painful?  right hip/ SI joint    What caused the injury /pain? No inciting event     How long ago did your injury occur or pain begin? several days ago    What are your most bothersome symptoms? Pain    How would you characterize your symptom?  aching and sharp    What makes your symptoms better? Rest, Ice and Ibuprofen    What makes your symptoms worse? Walking    Have you been previously seen for this problem? No    Medical History:    Any recent changes to your medical history? No    Any new medication prescribed since last visit? No    Have you had surgery on this body part before? No    Social History:    Occupation: Medical Interp    Handedness: Right    Exercise: Running, yoga    Review of Systems:    Do you have fever, chills, weight loss? No    Do you have any vision problems? No    Do you have any chest pain or edema? No    Do you have any shortness of breath or wheezing?  No    Do you have stomach problems? No    Do you have any numbness or focal weakness? No    Do you have diabetes? No    Do you have problems with bleeding or clotting? No    Do you have an rashes or other skin lesions? No           PMH:  Past Medical History:   Diagnosis Date     Eating disorder      Generalized anxiety disorder        Active problem list:  Patient Active Problem List   Diagnosis     ZEB (generalized anxiety disorder)     " Eating disorder     CARDIOVASCULAR SCREENING; LDL GOAL LESS THAN 160       FH:  Family History   Problem Relation Age of Onset     Anesthesia Reaction Mother      nausea     Pancreatic Cancer Mother 47     alive     Anesthesia Reaction Maternal Grandmother      nausea     Depression Maternal Grandmother      past, not currently     Breast Cancer No family hx of      Colon Cancer No family hx of        SH:  Social History     Social History     Marital status: Single     Spouse name: N/A     Number of children: 0     Years of education: N/A     Occupational History     Critical access hospital Student     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     Social History Main Topics     Smoking status: Never Smoker     Smokeless tobacco: Never Used     Alcohol use 1.8 oz/week     3 Standard drinks or equivalent per week      Comment: socially     Drug use: No     Sexual activity: Yes     Partners: Male     Birth control/ protection: IUD     Other Topics Concern     Not on file     Social History Narrative       MEDS:  See EMR, reviewed  ALL:  See EMR, reviewed    REVIEW OF SYSTEMS:  CONSTITUTIONAL:NEGATIVE for fever, chills, change in weight  INTEGUMENTARY/SKIN: NEGATIVE for worrisome rashes, moles or lesions  EYES: NEGATIVE for vision changes or irritation  ENT/MOUTH: NEGATIVE for ear, mouth and throat problems  RESP:NEGATIVE for significant cough or SOB  BREAST: NEGATIVE for masses, tenderness or discharge  CV: NEGATIVE for chest pain, palpitations or peripheral edema  GI: NEGATIVE for nausea, abdominal pain, heartburn, or change in bowel habits  :NEGATIVE for frequency, dysuria, or hematuria  :NEGATIVE for frequency, dysuria, or hematuria  NEURO: NEGATIVE for weakness, dizziness or paresthesias  ENDOCRINE: NEGATIVE for temperature intolerance, skin/hair changes  HEME/ALLERGY/IMMUNE: NEGATIVE for bleeding problems  PSYCHIATRIC: NEGATIVE for changes in mood or affect          SUBJECTIVE:  This 22-year-old runner in the last 5 days  has a tendency to note discomfort focal to the area of her right buttock.  It will bother her when she is trying to change positions, such as getting out of a chair to start walking or positioning herself in bed.  It bothers her sometimes with her first few steps to make an athletic movement.  It is not associated with centralized back pain.  It is not associated with radicular discomfort down the leg or numbness or tingling.  She does not feel it in the groin.  She has not had this problem in the past.      OBJECTIVE:  Forward flexion to touch the ground without discomfort and the PSIS seem to excurse symmetrically.  Extension of the back is without discomfort.  Straight leg raise is negative bilaterally.  Lower extremity strength to flexion/extension at hip, knee, ankle including toe strength and foot evertor strength are 5/5 symmetrically bilaterally.  Normal range of motion at the bilateral hips.  EAMON test reproduces some discomfort into the right buttock.  It is negative on the left.  She is nontender directly over the lumbar spine to spring testing and nontender directly over the sacrum or the sacroiliac joints.  Distal pulses and sensation are intact.  Nontender over the greater trochanter on the right or the gluteus medius attachment or the course of the IT band.  An Chantal test is negative.      ASSESSMENT:  Right-sided buttock discomfort x5 days, suspect sacroiliac joint dysfunction.      PLAN:  The patient is willing to cross train for the next 2 weeks and avoid excessive pounding with running.  She has options of elliptical  and weight room and a spin class, which she enjoys.  She is doing yoga.  She was taught some additional stretches today that seem to be appropriate.  If she is not improving, she knows to follow up in [facility] for an additional physical exam and if the problem seems to be consistent with a sacroiliac joint, a specific physical therapy with a manual physical  therapist, such as Keke Atkins, could be considered.  She will follow up if not improved.         Again, thank you for allowing me to participate in the care of your patient.      Sincerely,    Kyaw Flynn MD

## 2018-04-25 NOTE — MR AVS SNAPSHOT
After Visit Summary   4/25/2018    Clark Crawford    MRN: [id number]           Patient Information     Date Of Birth          1995        Visit Information        Provider Department      4/25/2018 1:40 PM Jonathan Pruitt MD [facility] [place]        Today's Diagnoses     Menorrhagia with irregular cycle    -  1    IUD (intrauterine device) in place           Follow-ups after your visit        Follow-up notes from your care team     Return if symptoms worsen or fail to improve.      Future tests that were ordered for you today     Open Future Orders        Priority Expected Expires Ordered    US Pelvic Complete w Transvaginal Routine  4/25/2019 4/25/2018            Who to contact     If you have questions or need follow up information about today's clinic visit or your schedule please contact [facility] [place] directly at [phone].  Normal or non-critical lab and imaging results will be communicated to you by [employer], letter or phone within 4 business days after the clinic has received the results. If you do not hear from us within 7 days, please contact the clinic through [employer] or phone. If you have a critical or abnormal lab result, we will notify you by phone as soon as possible.  Submit refill requests through Sandbox or call your pharmacy and they will forward the refill request to us. Please allow 3 business days for your refill to be completed.          Additional Information About Your Visit        MyChart Information     Sandbox gives you secure access to your electronic health record. If you see a primary care provider, you can also send messages to your care team and make appointments. If you have questions, please call your primary care clinic.  If you do not have a primary care provider, please call [phone] and they will assist you.        Care EveryWhere ID     This is your Care EveryWhere ID. This could be used by other organizations to access your  "[place] medical records  [id number]        Your Vitals Were     Pulse Temperature Respirations Height Last Period Pulse Oximetry    66 99.1  F (37.3  C) 14 5' 4.5\" (1.638 m) 04/04/2018 (Approximate) 98%    BMI (Body Mass Index)                   20.79 kg/m2            Blood Pressure from Last 3 Encounters:   04/25/18 92/55   12/22/17 92/60   12/14/17 100/66    Weight from Last 3 Encounters:   04/25/18 123 lb (55.8 kg)   12/22/17 120 lb (54.4 kg)   12/14/17 123 lb (55.8 kg)               Primary Care Provider Office Phone # Fax #    Caro Ríos [phone]       [address] [place] [id number]        Equal Access to Services     [facility]: Hadii nabor vazquez hadasho Soomaali, waaxda luqadaha, qaybta kaalmada adeegyada, yesenia palm hayyasmine ordaz . So [facility] [phone].    ATENCIÓN: Si habla español, tiene a khan disposición servicios gratuitos de asistencia lingüística. Shelliame al [phone].    We comply with applicable federal civil rights laws and [place] laws. We do not discriminate on the basis of race, color, national origin, age, disability, sex, sexual orientation, or gender identity.            Thank you!     Thank you for choosing [facility]  for your care. Our goal is always to provide you with excellent care. Hearing back from our patients is one way we can continue to improve our services. Please take a few minutes to complete the written survey that you may receive in the mail after your visit with us. Thank you!             Your Updated Medication List - Protect others around you: Learn how to safely use, store and throw away your medicines at www.disposemymeds.org.          This list is accurate as of 4/25/18  2:15 PM.  Always use your most recent med list.                   Brand Name Dispense Instructions for use Diagnosis    ketoconazole 2 % cream    NIZORAL    60 g    APPLY TO AFFECTED AREA(S) TWO TIMES A DAY FOR 2 TO 3 " WEEKS    Tinea versicolor       levonorgestrel 20 MCG/24HR IUD    MIRENA    1 each    1 each (20 mcg) by Intrauterine route once for 1 dose    Encounter for IUD insertion       ZOLOFT PO      Take 100 mg by mouth daily

## 2018-04-25 NOTE — TELEPHONE ENCOUNTER
CAYLA Pruitt- please advise if you would prefer the patient go to the ER.    Clark Crawford is a 22 year old female who calls with unusually heavy vaginal bleeding and cramping. Patient shas an IUD, but has gotten her period the last 2 months. Patient states that on Monday morning after intercourse she had some bleeding. State that her partner said that he felt something.  Today the patient rates her cramps as 7/10. She soaked through a panty liner overnight. This morning 1 tampon lasted 2 1/2 hours.   Denies feeling weak or light headed.    NURSING ASSESSMENT:  Description:  above  Onset/duration:  Spotting started Monday, today cramps and increased bleeding  Precip. factors:  Problem with IUD?  Associated symptoms:  Cramping and vaginal bleeding, no weakness, dizziness or confusion  Improves/worsens symptoms:  nothing  Pain scale (0-10)   7/10  LMP/preg/breast feeding:  Patient states that she does not keep track  Last exam/Treatment:  12/22/17  Allergies: No Known Allergies    NURSING PLAN: Routed to provider Yes    RECOMMENDED DISPOSITION:  See in 4 hours, another person to drive - if feeling light headed or dizzy  Will comply with recommendation: Yes  If further questions/concerns or if symptoms do not improve, worsen or new symptoms develop, call your PCP or [facility] as soon as possible.  Advised ER if increasing pain or increase in vaginal bleeding: if less than 1 tampon an hour and weak or if greater than 2 tampons per 2 hours    Guideline used:  Telephone Triage Protocols for Nurses, Fourth Edition, Keke De Souza RN

## 2018-04-25 NOTE — PROGRESS NOTES
SUBJECTIVE:   Clark Crawford is a 22 year old female who presents to clinic today for the following health issues:      Vaginal Bleeding (Dysmenorrhea)      Onset: started yesterday morning after intercourse     Description:  Frequency between periods:  Not sure, once a month   Describe bleeding/flow:   Clots: YES  Number of pads/hour: last time she inserted her tampon was at about 9:00 AM   Cramping: moderate    Intensity:  moderate    Accompanying signs and symptoms: none     History (similar episodes/previous evaluation): none     Precipitating or alleviating factors: None    Therapies tried and outcome: Ibuprofen       Problem list and histories reviewed & adjusted, as indicated.  Additional history: as documented    Patient Active Problem List   Diagnosis     ZEB (generalized anxiety disorder)     Eating disorder     CARDIOVASCULAR SCREENING; LDL GOAL LESS THAN 160     Past Surgical History:   Procedure Laterality Date     NO HISTORY OF SURGERY         Social History   Substance Use Topics     Smoking status: Never Smoker     Smokeless tobacco: Never Used     Alcohol use 1.8 oz/week     3 Standard drinks or equivalent per week      Comment: socially     Family History   Problem Relation Age of Onset     Anesthesia Reaction Mother      nausea     Pancreatic Cancer Mother 47     alive     Anesthesia Reaction Maternal Grandmother      nausea     Depression Maternal Grandmother      past, not currently     Breast Cancer No family hx of      Colon Cancer No family hx of          Current Outpatient Prescriptions   Medication Sig Dispense Refill     ketoconazole (NIZORAL) 2 % cream APPLY TO AFFECTED AREA(S) TWO TIMES A DAY FOR 2 TO 3 WEEKS 60 g 2     Sertraline HCl (ZOLOFT PO) Take 100 mg by mouth daily       levonorgestrel (MIRENA) 20 MCG/24HR IUD 1 each (20 mcg) by Intrauterine route once for 1 dose 1 each 0     No Known Allergies  Recent Labs   Lab Test  08/10/16   1356   ALT  20   CR  0.73   GFRESTIMATED  >90  Non  " GFR Calc     GFRESTBLACK  >90   GFR Calc     POTASSIUM  3.8      BP Readings from Last 3 Encounters:   04/25/18 92/55   12/22/17 92/60   12/14/17 100/66    Wt Readings from Last 3 Encounters:   04/25/18 123 lb (55.8 kg)   12/22/17 120 lb (54.4 kg)   12/14/17 123 lb (55.8 kg)                    Reviewed and updated as needed this visit by clinical staff       Reviewed and updated as needed this visit by Provider         ROS:  Constitutional, HEENT, cardiovascular, pulmonary, gi and gu systems are negative, except as otherwise noted.    OBJECTIVE:     BP 92/55 (Cuff Size: Adult Regular)  Pulse 66  Temp 99.1  F (37.3  C)  Resp 14  Ht 5' 4.5\" (1.638 m)  Wt 123 lb (55.8 kg)  LMP 04/04/2018 (Approximate)  SpO2 98%  BMI 20.79 kg/m2  Body mass index is 20.79 kg/(m^2).  GENERAL: healthy, alert and no distress  NECK: no adenopathy, no asymmetry, masses, or scars and thyroid normal to palpation  RESP: lungs clear to auscultation - no rales, rhonchi or wheezes  CV: regular rate and rhythm, normal S1 S2, no S3 or S4, no murmur, click or rub, no peripheral edema and peripheral pulses strong  ABDOMEN: soft, nontender, no hepatosplenomegaly, no masses and bowel sounds normal   (female): normal female external genitalia, vaginal mucosa pink, moist, well rugated, normal cervix, adnexae, and uterus without masses. and small amount of bloody discharge without finding of active bleeding, IUD ring present, has no finding of dislodged nor displaced   MS: no gross musculoskeletal defects noted, no edema        ASSESSMENT/PLAN:   Clark was seen today for vaginal bleeding.    Diagnoses and all orders for this visit:    Menorrhagia with irregular cycle  -     US Pelvic Complete w Transvaginal; Future    IUD (intrauterine device) in place  -     US Pelvic Complete w Transvaginal; Future    Other orders  -     Cancel: CBC with platelets  -     Cancel: HCG qualitative urine      Has normal finding of " IUD, has no palpable mass, will have her to check US for further eval about regular spotting/bleeding even after Mirena placed 4 months ago,         Jonathan Pruitt MD  [facility]

## 2018-04-25 NOTE — MR AVS SNAPSHOT
"              After Visit Summary   4/25/2018    Clark Crawford    MRN: [id number]           Patient Information     Date Of Birth          1995        Visit Information        Provider Department      4/25/2018 3:40 PM Kyaw Flynn MD [facility]        Today's Diagnoses     Sacroiliac joint dysfunction    -  1       Follow-ups after your visit        Future tests that were ordered for you today     Open Future Orders        Priority Expected Expires Ordered    US Pelvic Complete w Transvaginal Routine  4/25/2019 4/25/2018            Who to contact     Please call your clinic at [phone] to:    Ask questions about your health    Make or cancel appointments    Discuss your medicines    Learn about your test results    Speak to your doctor            Additional Information About Your Visit        [employer] Information     [employer] gives you secure access to your electronic health record. If you see a primary care provider, you can also send messages to your care team and make appointments. If you have questions, please call your primary care clinic.  If you do not have a primary care provider, please call [phone] and they will assist you.      [employer] is an electronic gateway that provides easy, online access to your medical records. With [employer], you can request a clinic appointment, read your test results, renew a prescription or communicate with your care team.     To access your existing account, please contact your [facility] or call [phone] for assistance.        Care EveryWhere ID     This is your Care EveryWhere ID. This could be used by other organizations to access your [place] medical records  [id number]        Your Vitals Were     Pulse Height Last Period BMI (Body Mass Index)          66 5' 4.5\" (1.638 m) 04/04/2018 (Approximate) 20.79 kg/m2         Blood Pressure from Last 3 Encounters:   04/25/18 92/55   04/25/18 " 92/55   12/22/17 92/60    Weight from Last 3 Encounters:   04/25/18 123 lb (55.8 kg)   04/25/18 123 lb (55.8 kg)   12/22/17 120 lb (54.4 kg)              Today, you had the following     No orders found for display       Primary Care Provider Office Phone # Fax #    Caro Ríos [phone]       [address] [place] [address]        Equal Access to Services     VIVIANA LUGO : Hadii aad ku hadasho Soomaali, waaxda luqadaha, qaybta kaalmada adeegyada, waxay idiin hayaan ademary khfortino ordaz . So [facility] [phone].    ATENCIÓN: Si habla español, tiene a khan disposición servicios gratuitos de asistencia lingüística. Ll[facility] [phone].    We comply with applicable federal civil rights laws and [place] laws. We do not discriminate on the basis of race, color, national origin, age, disability, sex, sexual orientation, or gender identity.            Thank you!     Thank you for choosing [facility]  for your care. Our goal is always to provide you with excellent care. Hearing back from our patients is one way we can continue to improve our services. Please take a few minutes to complete the written survey that you may receive in the mail after your visit with us. Thank you!             Your Updated Medication List - Protect others around you: Learn how to safely use, store and throw away your medicines at www.disposemymeds.org.          This list is accurate as of 4/25/18 11:59 PM.  Always use your most recent med list.                   Brand Name Dispense Instructions for use Diagnosis    ketoconazole 2 % cream    NIZORAL    60 g    APPLY TO AFFECTED AREA(S) TWO TIMES A DAY FOR 2 TO 3 WEEKS    Tinea versicolor       levonorgestrel 20 MCG/24HR IUD    MIRENA    1 each    1 each (20 mcg) by Intrauterine route once for 1 dose    Encounter for IUD insertion       ZOLOFT PO      Take 100 mg by mouth daily

## 2018-04-25 NOTE — NURSING NOTE
"Chief Complaint   Patient presents with     Vaginal Bleeding       Initial BP 92/55 (Cuff Size: Adult Regular)  Pulse 66  Temp 99.1  F (37.3  C)  Resp 14  Ht 5' 4.5\" (1.638 m)  Wt 123 lb (55.8 kg)  LMP 04/04/2018 (Approximate)  SpO2 98%  BMI 20.79 kg/m2 Estimated body mass index is 20.79 kg/(m^2) as calculated from the following:    Height as of this encounter: 5' 4.5\" (1.638 m).    Weight as of this encounter: 123 lb (55.8 kg).  Medication Reconciliation: complete   Yoselyn Cruz, CMA    "

## 2018-04-25 NOTE — PROGRESS NOTES
"      SPORTS & ORTHOPEDIC WALK-IN VISIT 4/25/2018    Primary Care Physician: Dr. Ríos  Woke up last week Thursday. Woke up with \"tightness\". Does run every other day. Took a few days off running, started to feel better. Runs about 29 miles a week  Reason for visit:     What part of your body is injured / painful?  right hip/ SI joint    What caused the injury /pain? No inciting event     How long ago did your injury occur or pain begin? several days ago    What are your most bothersome symptoms? Pain    How would you characterize your symptom?  aching and sharp    What makes your symptoms better? Rest, Ice and Ibuprofen    What makes your symptoms worse? Walking    Have you been previously seen for this problem? No    Medical History:    Any recent changes to your medical history? No    Any new medication prescribed since last visit? No    Have you had surgery on this body part before? No    Social History:    Occupation: Medical Interp    Handedness: Right    Exercise: Running, yoga    Review of Systems:    Do you have fever, chills, weight loss? No    Do you have any vision problems? No    Do you have any chest pain or edema? No    Do you have any shortness of breath or wheezing?  No    Do you have stomach problems? No    Do you have any numbness or focal weakness? No    Do you have diabetes? No    Do you have problems with bleeding or clotting? No    Do you have an rashes or other skin lesions? No           PMH:  Past Medical History:   Diagnosis Date     Eating disorder      Generalized anxiety disorder        Active problem list:  Patient Active Problem List   Diagnosis     ZEB (generalized anxiety disorder)     Eating disorder     CARDIOVASCULAR SCREENING; LDL GOAL LESS THAN 160       FH:  Family History   Problem Relation Age of Onset     Anesthesia Reaction Mother      nausea     Pancreatic Cancer Mother 47     alive     Anesthesia Reaction Maternal Grandmother      nausea     Depression Maternal " Grandmother      past, not currently     Breast Cancer No family hx of      Colon Cancer No family hx of        SH:  Social History     Social History     Marital status: Single     Spouse name: N/A     Number of children: 0     Years of education: N/A     Occupational History     [employer] Student     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     Social History Main Topics     Smoking status: Never Smoker     Smokeless tobacco: Never Used     Alcohol use 1.8 oz/week     3 Standard drinks or equivalent per week      Comment: socially     Drug use: No     Sexual activity: Yes     Partners: Male     Birth control/ protection: IUD     Other Topics Concern     Not on file     Social History Narrative       MEDS:  See EMR, reviewed  ALL:  See EMR, reviewed    REVIEW OF SYSTEMS:  CONSTITUTIONAL:NEGATIVE for fever, chills, change in weight  INTEGUMENTARY/SKIN: NEGATIVE for worrisome rashes, moles or lesions  EYES: NEGATIVE for vision changes or irritation  ENT/MOUTH: NEGATIVE for ear, mouth and throat problems  RESP:NEGATIVE for significant cough or SOB  BREAST: NEGATIVE for masses, tenderness or discharge  CV: NEGATIVE for chest pain, palpitations or peripheral edema  GI: NEGATIVE for nausea, abdominal pain, heartburn, or change in bowel habits  :NEGATIVE for frequency, dysuria, or hematuria  :NEGATIVE for frequency, dysuria, or hematuria  NEURO: NEGATIVE for weakness, dizziness or paresthesias  ENDOCRINE: NEGATIVE for temperature intolerance, skin/hair changes  HEME/ALLERGY/IMMUNE: NEGATIVE for bleeding problems  PSYCHIATRIC: NEGATIVE for changes in mood or affect          SUBJECTIVE:  This 22-year-old runner in the last 5 days has a tendency to note discomfort focal to the area of her right buttock.  It will bother her when she is trying to change positions, such as getting out of a chair to start walking or positioning herself in bed.  It bothers her sometimes with her first few steps to make an athletic  movement.  It is not associated with centralized back pain.  It is not associated with radicular discomfort down the leg or numbness or tingling.  She does not feel it in the groin.  She has not had this problem in the past.      OBJECTIVE:  Forward flexion to touch the ground without discomfort and the PSIS seem to excurse symmetrically.  Extension of the back is without discomfort.  Straight leg raise is negative bilaterally.  Lower extremity strength to flexion/extension at hip, knee, ankle including toe strength and foot evertor strength are 5/5 symmetrically bilaterally.  Normal range of motion at the bilateral hips.  EAMON test reproduces some discomfort into the right buttock.  It is negative on the left.  She is nontender directly over the lumbar spine to spring testing and nontender directly over the sacrum or the sacroiliac joints.  Distal pulses and sensation are intact.  Nontender over the greater trochanter on the right or the gluteus medius attachment or the course of the IT band.  An Chantal test is negative.      ASSESSMENT:  Right-sided buttock discomfort x5 days, suspect sacroiliac joint dysfunction.      PLAN:  The patient is willing to cross train for the next 2 weeks and avoid excessive pounding with running.  She has options of elliptical  and weight room and a spin class, which she enjoys.  She is doing yoga.  She was taught some additional stretches today that seem to be appropriate.  If she is not improving, she knows to follow up in [facility] for an additional physical exam and if the problem seems to be consistent with a sacroiliac joint, a specific physical therapy with a manual physical therapist, such as Keke Atkins, could be considered.  She will follow up if not improved.

## 2018-04-26 ASSESSMENT — PATIENT HEALTH QUESTIONNAIRE - PHQ9: SUM OF ALL RESPONSES TO PHQ QUESTIONS 1-9: 3

## 2018-04-26 ASSESSMENT — ANXIETY QUESTIONNAIRES: GAD7 TOTAL SCORE: 3

## 2018-05-07 ENCOUNTER — TRANSFERRED RECORDS (OUTPATIENT)
Dept: HEALTH INFORMATION MANAGEMENT | Facility: CLINIC | Age: 23
End: 2018-05-07

## 2018-05-07 ENCOUNTER — TELEPHONE (OUTPATIENT)
Dept: FAMILY MEDICINE | Facility: CLINIC | Age: 23
End: 2018-05-07

## 2018-05-07 DIAGNOSIS — N92.6 IRREGULAR PERIODS: Primary | ICD-10-CM

## 2018-05-07 RX ORDER — DESOGESTREL AND ETHINYL ESTRADIOL 21-5 (28)
1 KIT ORAL DAILY
Qty: 56 TABLET | Refills: 0 | Status: CANCELLED | OUTPATIENT
Start: 2018-05-07

## 2018-05-07 NOTE — TELEPHONE ENCOUNTER
Patient given result message from Dr. Pruitt.   Patient is asking why she is still bleeding?  Keke De Souza RN

## 2018-05-07 NOTE — TELEPHONE ENCOUNTER
It may be nba variation or unstable temporary hormonal status.  I may have her to try low dose OCPs if she is interested in   Please ask her and let me know  thx

## 2018-05-07 NOTE — TELEPHONE ENCOUNTER
Patient notified with information noted below from provider and does not want to jump the gun.  She will wait and see if the bleeding does resolve if not she will contact us.  Katie Enriquez RN - Triage  [facility]

## 2018-08-31 ENCOUNTER — TELEPHONE (OUTPATIENT)
Dept: URGENT CARE | Facility: URGENT CARE | Age: 23
End: 2018-08-31

## 2018-08-31 ENCOUNTER — OFFICE VISIT (OUTPATIENT)
Dept: URGENT CARE | Facility: URGENT CARE | Age: 23
End: 2018-08-31
Payer: COMMERCIAL

## 2018-08-31 VITALS
DIASTOLIC BLOOD PRESSURE: 68 MMHG | BODY MASS INDEX: 20.62 KG/M2 | SYSTOLIC BLOOD PRESSURE: 98 MMHG | HEART RATE: 58 BPM | TEMPERATURE: 98.7 F | OXYGEN SATURATION: 100 % | RESPIRATION RATE: 12 BRPM | WEIGHT: 122 LBS

## 2018-08-31 DIAGNOSIS — R07.0 THROAT PAIN: Primary | ICD-10-CM

## 2018-08-31 DIAGNOSIS — B27.90 MONONUCLEOSIS: ICD-10-CM

## 2018-08-31 LAB
BASOPHILS # BLD AUTO: 0 10E9/L (ref 0–0.2)
BASOPHILS NFR BLD AUTO: 0.3 %
DEPRECATED S PYO AG THROAT QL EIA: NORMAL
DIFFERENTIAL METHOD BLD: ABNORMAL
EOSINOPHIL # BLD AUTO: 0 10E9/L (ref 0–0.7)
EOSINOPHIL NFR BLD AUTO: 0.4 %
ERYTHROCYTE [DISTWIDTH] IN BLOOD BY AUTOMATED COUNT: 13.2 % (ref 10–15)
HCT VFR BLD AUTO: 37.8 % (ref 35–47)
HETEROPH AB SER QL: POSITIVE
HGB BLD-MCNC: 12.3 G/DL (ref 11.7–15.7)
LYMPHOCYTES # BLD AUTO: 6 10E9/L (ref 0.8–5.3)
LYMPHOCYTES NFR BLD AUTO: 60.3 %
MCH RBC QN AUTO: 30.3 PG (ref 26.5–33)
MCHC RBC AUTO-ENTMCNC: 32.5 G/DL (ref 31.5–36.5)
MCV RBC AUTO: 93 FL (ref 78–100)
MONOCYTES # BLD AUTO: 1 10E9/L (ref 0–1.3)
MONOCYTES NFR BLD AUTO: 10.2 %
NEUTROPHILS # BLD AUTO: 2.9 10E9/L (ref 1.6–8.3)
NEUTROPHILS NFR BLD AUTO: 28.8 %
PLATELET # BLD AUTO: 115 10E9/L (ref 150–450)
RBC # BLD AUTO: 4.06 10E12/L (ref 3.8–5.2)
SPECIMEN SOURCE: NORMAL
WBC # BLD AUTO: 10 10E9/L (ref 4–11)

## 2018-08-31 PROCEDURE — 87880 STREP A ASSAY W/OPTIC: CPT | Performed by: PHYSICIAN ASSISTANT

## 2018-08-31 PROCEDURE — 85025 COMPLETE CBC W/AUTO DIFF WBC: CPT | Performed by: PHYSICIAN ASSISTANT

## 2018-08-31 PROCEDURE — 86308 HETEROPHILE ANTIBODY SCREEN: CPT | Performed by: PHYSICIAN ASSISTANT

## 2018-08-31 PROCEDURE — 99214 OFFICE O/P EST MOD 30 MIN: CPT | Performed by: PHYSICIAN ASSISTANT

## 2018-08-31 PROCEDURE — 87081 CULTURE SCREEN ONLY: CPT | Performed by: PHYSICIAN ASSISTANT

## 2018-08-31 PROCEDURE — 36415 COLL VENOUS BLD VENIPUNCTURE: CPT | Performed by: PHYSICIAN ASSISTANT

## 2018-08-31 RX ORDER — METHYLPREDNISOLONE 4 MG
TABLET, DOSE PACK ORAL
Qty: 21 TABLET | Refills: 0 | Status: SHIPPED | OUTPATIENT
Start: 2018-08-31 | End: 2021-04-06

## 2018-08-31 NOTE — MR AVS SNAPSHOT
After Visit Summary   8/31/2018    Clark Crawford    MRN: [id number]           Patient Information     Date Of Birth          1995        Visit Information        Provider Department      8/31/2018 3:40 PM Bay Heredia PA-C [facility]        Today's Diagnoses     Throat pain    -  1    Mononucleosis           Follow-ups after your visit        Who to contact     If you have questions or need follow up information about today's clinic visit or your schedule please contact [facility] directly at [phone].  Normal or non-critical lab and imaging results will be communicated to you by [employer], letter or phone within 4 business days after the clinic has received the results. If you do not hear from us within 7 days, please contact the clinic through [employer] or phone. If you have a critical or abnormal lab result, we will notify you by phone as soon as possible.  Submit refill requests through [employer] or call your pharmacy and they will forward the refill request to us. Please allow 3 business days for your refill to be completed.          Additional Information About Your Visit        MyChart Information     [employer] gives you secure access to your electronic health record. If you see a primary care provider, you can also send messages to your care team and make appointments. If you have questions, please call your primary care clinic.  If you do not have a primary care provider, please call [phone] and they will assist you.        Care EveryWhere ID     This is your Care EveryWhere ID. This could be used by other organizations to access your [place] medical records  [id number]        Your Vitals Were     Pulse Temperature Respirations Pulse Oximetry Breastfeeding? BMI (Body Mass Index)    58 98.7  F (37.1  C) (Oral) 12 100% No 20.62 kg/m2       Blood Pressure from Last 3 Encounters:   09/03/18 124/66   09/03/18 90/62   08/31/18  98/68    Weight from Last 3 Encounters:   09/03/18 115 lb 9.6 oz (52.4 kg)   09/03/18 115 lb (52.2 kg)   08/31/18 122 lb (55.3 kg)              We Performed the Following     Beta strep group A culture     CBC with platelets differential     Mononucleosis screen     Strep, Rapid Screen          Today's Medication Changes          These changes are accurate as of 8/31/18 11:59 PM.  If you have any questions, ask your nurse or doctor.               Start taking these medicines.        Dose/Directions    magic mouthwash suspension   Commonly known as:  ENTER INGREDIENTS IN COMMENTS   Used for:  Throat pain   Started by:  Bay Heredia PA-C        Dose:  5-10 mL   Swish and spit 5-10 mLs in mouth every 6 hours as needed compound  30 ml Benadryl (12.5 mg/5 ml), 60 ml Maalox and 30 ml Viscous Lidocaine   Quantity:  120 mL   Refills:  0       methylPREDNISolone 4 MG tablet   Commonly known as:  MEDROL DOSEPAK   Used for:  Mononucleosis   Started by:  Bay Heredia PA-C        Follow package instructions   Quantity:  21 tablet   Refills:  0            Where to get your medicines      These medications were sent to [facility] [place] [id number]     Phone:  [phone]     methylPREDNISolone 4 MG tablet         Some of these will need a paper prescription and others can be bought over the counter.  Ask your nurse if you have questions.     Bring a paper prescription for each of these medications     magic mouthwash suspension                Primary Care Provider Office Phone # Fax #    Caro Ríos -[phone]       [address] [place] [address]        Equal Access to Services     REAL LUGO : Anyi Headley, waaleda luqadaha, qaybta kaalmada bubba, yesenia bird. So [facility] [phone].    ATENCIÓN: Si wilfredo gaspar, kamilah blanco khan  disposición servicios gratuitos de asistencia lingüística. Sarahi escboar [phone].    We comply with applicable federal civil rights laws and [place] laws. We do not discriminate on the basis of race, color, national origin, age, disability, sex, sexual orientation, or gender identity.            Thank you!     Thank you for choosing [facility]  for your care. Our goal is always to provide you with excellent care. Hearing back from our patients is one way we can continue to improve our services. Please take a few minutes to complete the written survey that you may receive in the mail after your visit with us. Thank you!             Your Updated Medication List - Protect others around you: Learn how to safely use, store and throw away your medicines at www.disposemymeds.org.          This list is accurate as of 8/31/18 11:59 PM.  Always use your most recent med list.                   Brand Name Dispense Instructions for use Diagnosis    ketoconazole 2 % cream    NIZORAL    60 g    APPLY TO AFFECTED AREA(S) TWO TIMES A DAY FOR 2 TO 3 WEEKS    Tinea versicolor       levonorgestrel 20 MCG/24HR IUD    MIRENA    1 each    1 each (20 mcg) by Intrauterine route once for 1 dose    Encounter for IUD insertion       magic mouthwash suspension    ENTER INGREDIENTS IN COMMENTS    120 mL    Swish and spit 5-10 mLs in mouth every 6 hours as needed compound  30 ml Benadryl (12.5 mg/5 ml), 60 ml Maalox and 30 ml Viscous Lidocaine    Throat pain       methylPREDNISolone 4 MG tablet    MEDROL DOSEPAK    21 tablet    Follow package instructions    Mononucleosis       ZOLOFT PO      Take 100 mg by mouth daily

## 2018-08-31 NOTE — TELEPHONE ENCOUNTER
Letter placed at the  or patient to  at her earliest convenience per provider.    Yaquelin Hurst, WES on 8/31/2018 at 5:15 PM

## 2018-08-31 NOTE — LETTER
[facility]  [address] [address]  [phone]      August 31, 2018    RE:  Clark Crawford                                                                                                                                                       [address] [place] [address] [address] [place] [address]            To whom it may concern:    Clark Crawford was seen in the urgent care today for mono.  She will miss work/school for 2 weeks due to the contagiousness of this infection.  Follow up with PCP in 1 week for recheck.        Sincerely,        Bay Heredia [facility] PAC    [facility]

## 2018-09-01 LAB
BACTERIA SPEC CULT: NORMAL
SPECIMEN SOURCE: NORMAL

## 2018-09-03 ENCOUNTER — HOSPITAL ENCOUNTER (EMERGENCY)
Facility: CLINIC | Age: 23
Discharge: HOME | End: 2018-09-03
Attending: PHYSICIAN ASSISTANT | Admitting: PHYSICIAN ASSISTANT

## 2018-09-03 ENCOUNTER — OFFICE VISIT (OUTPATIENT)
Dept: URGENT CARE | Facility: URGENT CARE | Age: 23
End: 2018-09-03

## 2018-09-03 VITALS
WEIGHT: 115.6 LBS | OXYGEN SATURATION: 100 % | BODY MASS INDEX: 19.74 KG/M2 | HEIGHT: 64 IN | TEMPERATURE: 97.9 F | SYSTOLIC BLOOD PRESSURE: 124 MMHG | DIASTOLIC BLOOD PRESSURE: 66 MMHG | RESPIRATION RATE: 16 BRPM

## 2018-09-03 VITALS
OXYGEN SATURATION: 100 % | SYSTOLIC BLOOD PRESSURE: 90 MMHG | TEMPERATURE: 99.6 F | HEART RATE: 103 BPM | RESPIRATION RATE: 12 BRPM | WEIGHT: 115 LBS | BODY MASS INDEX: 19.43 KG/M2 | DIASTOLIC BLOOD PRESSURE: 62 MMHG

## 2018-09-03 DIAGNOSIS — E86.0 DEHYDRATION: ICD-10-CM

## 2018-09-03 DIAGNOSIS — J03.90 TONSILLITIS: ICD-10-CM

## 2018-09-03 DIAGNOSIS — J36 PERITONSILLAR ABSCESS: ICD-10-CM

## 2018-09-03 DIAGNOSIS — B27.90 MONONUCLEOSIS: ICD-10-CM

## 2018-09-03 DIAGNOSIS — J02.9 SORETHROAT: Primary | ICD-10-CM

## 2018-09-03 DIAGNOSIS — R07.0 THROAT PAIN: ICD-10-CM

## 2018-09-03 LAB
DEPRECATED S PYO AG THROAT QL EIA: NORMAL
SPECIMEN SOURCE: NORMAL

## 2018-09-03 PROCEDURE — 87081 CULTURE SCREEN ONLY: CPT | Performed by: FAMILY MEDICINE

## 2018-09-03 PROCEDURE — 99282 EMERGENCY DEPT VISIT SF MDM: CPT

## 2018-09-03 PROCEDURE — 99215 OFFICE O/P EST HI 40 MIN: CPT | Performed by: FAMILY MEDICINE

## 2018-09-03 PROCEDURE — 87880 STREP A ASSAY W/OPTIC: CPT | Performed by: FAMILY MEDICINE

## 2018-09-03 RX ORDER — HYDROCODONE BITARTRATE AND ACETAMINOPHEN 5; 325 MG/1; MG/1
1 TABLET ORAL EVERY 4 HOURS PRN
Qty: 8 TABLET | Refills: 0 | Status: SHIPPED | OUTPATIENT
Start: 2018-09-03 | End: 2019-03-12

## 2018-09-03 RX ORDER — CEFTRIAXONE 1 G/1
1000 INJECTION, POWDER, FOR SOLUTION INTRAMUSCULAR; INTRAVENOUS ONCE
Qty: 10 ML | Refills: 0
Start: 2018-09-03 | End: 2018-09-03

## 2018-09-03 RX ORDER — PREDNISONE 20 MG/1
20 TABLET ORAL 2 TIMES DAILY
Qty: 10 TABLET | Refills: 0 | Status: SHIPPED | OUTPATIENT
Start: 2018-09-03 | End: 2021-04-06

## 2018-09-03 RX ORDER — PREDNISONE 20 MG/1
40 TABLET ORAL ONCE
Qty: 2 TABLET | Refills: 0
Start: 2018-09-03 | End: 2018-09-03

## 2018-09-03 RX ORDER — AZITHROMYCIN 250 MG/1
TABLET, FILM COATED ORAL
Qty: 6 TABLET | Refills: 0 | Status: SHIPPED | OUTPATIENT
Start: 2018-09-03 | End: 2018-09-03

## 2018-09-03 ASSESSMENT — ENCOUNTER SYMPTOMS
TROUBLE SWALLOWING: 1
SHORTNESS OF BREATH: 0
SORE THROAT: 1

## 2018-09-03 NOTE — MR AVS SNAPSHOT
After Visit Summary   9/3/2018    Clark Crawford    MRN: [id number]           Patient Information     Date Of Birth          1995        Visit Information        Provider Department      9/3/2018 2:25 PM Chen Mar MD [facility]        Today's Diagnoses     Sorethroat    -  1    Tonsillitis        Peritonsillar abscess        Dehydration           Follow-ups after your visit        Who to contact     If you have questions or need follow up information about today's clinic visit or your schedule please contact [facility] directly at [phone].  Normal or non-critical lab and imaging results will be communicated to you by MyChart, letter or phone within 4 business days after the clinic has received the results. If you do not hear from us within 7 days, please contact the clinic through [employer] or phone. If you have a critical or abnormal lab result, we will notify you by phone as soon as possible.  Submit refill requests through [employer] or call your pharmacy and they will forward the refill request to us. Please allow 3 business days for your refill to be completed.          Additional Information About Your Visit        MyChart Information     [employer] gives you secure access to your electronic health record. If you see a primary care provider, you can also send messages to your care team and make appointments. If you have questions, please call your primary care clinic.  If you do not have a primary care provider, please call [phone] and they will assist you.        Care EveryWhere ID     This is your Care EveryWhere ID. This could be used by other organizations to access your [facility] medical records  [id number]        Your Vitals Were     Pulse Temperature Respirations Pulse Oximetry BMI (Body Mass Index)       103 99.6  F (37.6  C) (Oral) 12 100% 19.43 kg/m2        Blood Pressure from Last 3 Encounters:   09/03/18 90/62    09/03/18 124/66   08/31/18 98/68    Weight from Last 3 Encounters:   09/03/18 115 lb (52.2 kg)   09/03/18 115 lb 9.6 oz (52.4 kg)   08/31/18 122 lb (55.3 kg)              We Performed the Following     Beta strep group A culture     Strep, Rapid Screen          Today's Medication Changes          These changes are accurate as of 9/3/18  5:57 PM.  If you have any questions, ask your nurse or doctor.               Start taking these medicines.        Dose/Directions    * predniSONE 20 MG tablet   Commonly known as:  DELTASONE   Used for:  Tonsillitis   Started by:  Chen Mar MD        Dose:  20 mg   Take 1 tablet (20 mg) by mouth 2 times daily   Quantity:  10 tablet   Refills:  0       * predniSONE 20 MG tablet   Commonly known as:  DELTASONE   Used for:  Tonsillitis   Started by:  Chen Mar MD        Dose:  40 mg   Take 2 tablets (40 mg) by mouth once for 1 dose   Quantity:  2 tablet   Refills:  0       * Notice:  This list has 2 medication(s) that are the same as other medications prescribed for you. Read the directions carefully, and ask your doctor or other care provider to review them with you.         Where to get your medicines      These medications were sent to [facility]/pharmacy #[id number] [place] [id number] [address] [address]     Phone:  [phone]     predniSONE 20 MG tablet         Some of these will need a paper prescription and others can be bought over the counter.  Ask your nurse if you have questions.     You don't need a prescription for these medications     predniSONE 20 MG tablet                Primary Care Provider Office Phone # Fax #    Caro Ríos [phone]       [address] [place] [address]        Equal Access to Services     VIVIANA LUGO AH: Anyi Headley, lena biswas, yesenia astudillo. So [facility] [phone].    ATENCIÓN: Si  wilfredo gaspar, tiene a khan disposición servicios gratuitos de asistencia lingüística. Sarahi escobar [phone].    We comply with applicable federal civil rights laws and [place] laws. We do not discriminate on the basis of race, color, national origin, age, disability, sex, sexual orientation, or gender identity.            Thank you!     Thank you for choosing [facility]  for your care. Our goal is always to provide you with excellent care. Hearing back from our patients is one way we can continue to improve our services. Please take a few minutes to complete the written survey that you may receive in the mail after your visit with us. Thank you!             Your Updated Medication List - Protect others around you: Learn how to safely use, store and throw away your medicines at www.disposemymeds.org.          This list is accurate as of 9/3/18  5:57 PM.  Always use your most recent med list.                   Brand Name Dispense Instructions for use Diagnosis    ketoconazole 2 % cream    NIZORAL    60 g    APPLY TO AFFECTED AREA(S) TWO TIMES A DAY FOR 2 TO 3 WEEKS    Tinea versicolor       levonorgestrel 20 MCG/24HR IUD    MIRENA    1 each    1 each (20 mcg) by Intrauterine route once for 1 dose    Encounter for IUD insertion       magic mouthwash suspension    ENTER INGREDIENTS IN COMMENTS    120 mL    Swish and spit 5-10 mLs in mouth every 6 hours as needed compound  30 ml Benadryl (12.5 mg/5 ml), 60 ml Maalox and 30 ml Viscous Lidocaine    Throat pain       methylPREDNISolone 4 MG tablet    MEDROL DOSEPAK    21 tablet    Follow package instructions    Mononucleosis       * predniSONE 20 MG tablet    DELTASONE    10 tablet    Take 1 tablet (20 mg) by mouth 2 times daily    Tonsillitis       * predniSONE 20 MG tablet    DELTASONE    2 tablet    Take 2 tablets (40 mg) by mouth once for 1 dose    Tonsillitis       ZOLOFT PO      Take 100 mg by mouth daily        * Notice:  This list has 2  medication(s) that are the same as other medications prescribed for you. Read the directions carefully, and ask your doctor or other care provider to review them with you.

## 2018-09-03 NOTE — ED PROVIDER NOTES
"Emergency Department Attending Supervision Note  9/3/2018  6:58 PM      I evaluated this patient in conjunction with Natalie Ignacio PA-C      Briefly, the patient presented for evaluation of pharyngitis for the last three days. Of note, patient has been evaluated for her symptoms, including sore throat and associated left ear ache and difficulty swallowing, twice in Urgent Care. Patient was diagnosed with mono two days ago and presented back to Urgent Care today, but was referred here for further evaluation and to rule out possible peritonsillar abscess. Of note, patient has had two strep tests and both returned negative. Patient denies any shortness of breath or trouble swallowing.  No fever.  Continues to report moderate persistent throat pain.     On my exam:  /66  Temp 97.9  F (36.6  C) (Oral)  Resp 16  Ht 1.626 m (5' 4\")  Wt 52.4 kg (115 lb 9.6 oz)  SpO2 100%  BMI 19.84 kg/m2  General: Patient in mild distress.  Alert and cooperative with exam. Normal mentation.  Nontoxic appearance  HEENT: NC/AT. Conjunctiva without injection or scleral icterus. External ears normal.  No trismus.  Posterior oropharynx erythema with bilateral tonsillar exudate without evidence of significant tonsillar enlargement.  Uvula midline.  No evidence of deep space infection.  No significant cervical lymphadenopathy.  Patient tolerated secretions appropriately.    Respiratory: Breathing comfortably on room air  CV: Normal rate, all extremities well perfused  GI:  Non-distended nontender abdomen.  No significant splenic enlargement  Skin: Warm, dry, no rashes/open wounds on exposed skin  Musculoskeletal: No obvious deformities  Neuro: Alert, answers questions appropriately. No gross motor deficits    My impression:    Clark Crawford is a 23 year old female who comes in with persistent sore throat and recent positive mono test.  The patient was referred to the emergency department rule out peritonsillar abscess.  There is no " evidence of deep space infection or peritonsillar abscess on evaluation.  Uvula is midline.  She is tolerating secretions appropriately and at this time there is no indication for advanced neck imaging or further lab testing.  Recommended continued supportive care and close follow-up with PCP.  Return precautions were discussed.  Patient discharged home.  Presentation consistent with mononucleosis.    Diagnosis    ICD-10-CM    1. Throat pain R07.0    2. Mononucleosis B27.90          Negro Olivia, Negro Sagastume,   09/04/18 2317

## 2018-09-03 NOTE — ED AVS SNAPSHOT
Emergency Department    [address] [facility] [address]    Phone:  [phone]    Fax:  [phone]                                       Clark Crawford   MRN: [id number]    Department:   Emergency Department   Date of Visit:  9/3/2018           Patient Information     Date Of Birth          1995        Your diagnoses for this visit were:     Throat pain     Mononucleosis        You were seen by Natalie Ignacio PA-C.      Follow-up Information     Follow up with Caro Ríos MD In 2 days.    Specialty:  Internal Medicine    Why:  As needed    Contact information:    [address] [place] [id number] [phone]          Follow up with  Emergency Department.    Specialty:  EMERGENCY MEDICINE    Why:  As needed, If symptoms worsen    Contact information:    [address] [facility] [id number] [phone]        Discharge Instructions       *Take ibuprofen 600 mg 3x per day.  This will provide both pain control and fight against inflammation. Norco for breakthough pain.   *Continue Prednisone as prescribed.       Mononucleosis  Mononucleosis (also called mono) is a contagious viral infection. Most infants and children exposed to the virus get only mild flu-like symptoms or no symptoms at all. However, infection is usually more serious in teens and young adults. While the virus is active it causes symptoms and can spread to others. After symptoms subside, the virus stays in the body and eventually becomes inactive. Once you have one case of mono, you are unlikely to develop symptoms again.  The virus is usually spread by contact with saliva, often by kissing. It may also spread by breastmilk, blood, or sexual contact. It takes about 4 to 6 weeks to develop symptoms after exposure.  Early symptoms include headache, nausea, tiredness and general muscle aching. This is followed by sore throat and fever. Lymph glands in the neck, under the arms, or  in the groin may be swollen. Symptoms usually go away in about 1 to 2 months. But they can last up to four months.  If symptoms have been present less than 1 week or more than 3 weeks, the blood test used to diagnose this disease may be negative even though you have the illness.  In this case, other tests may be done.  Taking the antibiotics ampicillin or amoxicillin during a mono infection may cause a skin rash. This is not serious and will fade in about one week. The cause is a reaction of the drug with the virus.  Mono can cause your spleen to swell. The spleen is a fist-sized organ in the upper left abdomen that stores red blood cells. Injury to a swollen spleen can cause the spleen to rupture. This can cause life-threatening internal bleeding. To avoid this, do not play contact sports or perform strenuous activity for 8 weeks, or until cleared by your healthcare provider. A sharp blow could rupture a swollen spleen  Home care    Rest in bed until the fever and weakness have gone away.    Drink plenty of fluids, but avoid alcohol. Otherwise, you may eat a regular diet.    Ask your healthcare provider about using over-the-counter medicines to treat symptoms such as fever, pain, or an itchy rash.    Over-the-counter throat lozenges may help soothe a sore throat. Gargling with warm salt water (1/2 teaspoon in 1 glass of warm water) may also be soothing to the throat.    You may return to work or school after the fever goes away and you are feeling better. Continue to follow any activity restrictions you have been given.  Preventing spread of the virus  To limit the spread of the virus, avoid exposing others to your saliva for at least 6 months after your illness (no kissing or sharing utensils, drinking glasses, or toothbrushes).  Follow-up care  Follow up with your healthcare provider within 1 to 2 weeks or as advised by our staff to be sure that there are no complications. If symptoms of extreme fatigue and  swollen glands last longer than 6 months, see your healthcare provider for further testing.  When to seek medical advice  Call your healthcare provider right away if any of the following occur:    Excessive coughing    Yellow skin or eyes    Trouble swallowing  Call 911  Call 911 if any of the following occur:    Severe or worsening abdominal pain    Trouble breathing  Date Last Reviewed: 9/25/2015 2000-2017 [employer]. [address] [place] [address], [place] [address]. All rights reserved. This information is not intended as a substitute for professional medical care. Always follow your healthcare professional's instructions.          24 Hour Appointment Hotline       To make an appointment at any [facility], call [phone]). If you don't have a family doctor or clinic, we will help you find one. [facility] clinics are conveniently located to serve the needs of you and your family.             Review of your medicines      START taking        Dose / Directions Last dose taken    HYDROcodone-acetaminophen 5-325 MG per tablet   Commonly known as:  NORCO   Dose:  1 tablet   Quantity:  8 tablet        Take 1 tablet by mouth every 4 hours as needed for pain   Refills:  0          Our records show that you are taking the medicines listed below. If these are incorrect, please call your family doctor or clinic.        Dose / Directions Last dose taken    ketoconazole 2 % cream   Commonly known as:  NIZORAL   Quantity:  60 g        APPLY TO AFFECTED AREA(S) TWO TIMES A DAY FOR 2 TO 3 WEEKS   Refills:  2        levonorgestrel 20 MCG/24HR IUD   Commonly known as:  MIRENA   Dose:  1 each   Quantity:  1 each        1 each (20 mcg) by Intrauterine route once for 1 dose   Refills:  0        magic mouthwash suspension   Commonly known as:  ENTER INGREDIENTS IN COMMENTS   Dose:  5-10 mL   Quantity:  120 mL        Swish and spit 5-10 mLs in mouth every 6 hours as needed compound  30 ml Benadryl  (12.5 mg/5 ml), 60 ml Maalox and 30 ml Viscous Lidocaine   Refills:  0        methylPREDNISolone 4 MG tablet   Commonly known as:  MEDROL DOSEPAK   Quantity:  21 tablet        Follow package instructions   Refills:  0        * predniSONE 20 MG tablet   Commonly known as:  DELTASONE   Dose:  20 mg   Quantity:  10 tablet        Take 1 tablet (20 mg) by mouth 2 times daily   Refills:  0        * predniSONE 20 MG tablet   Commonly known as:  DELTASONE   Dose:  40 mg   Quantity:  2 tablet        Take 2 tablets (40 mg) by mouth once for 1 dose   Refills:  0        ZOLOFT PO   Dose:  100 mg        Take 100 mg by mouth daily   Refills:  0        * Notice:  This list has 2 medication(s) that are the same as other medications prescribed for you. Read the directions carefully, and ask your doctor or other care provider to review them with you.            Information about OPIOIDS     PRESCRIPTION OPIOIDS: WHAT YOU NEED TO KNOW   We gave you an opioid (narcotic) pain medicine. It is important to manage your pain, but opioids are not always the best choice. You should first try all the other options your care team gave you. Take this medicine for as short a time (and as few doses) as possible.    Some activities can increase your pain, such as bandage changes or therapy sessions. It may help to take your pain medicine 30 to 60 minutes before these activities. Reduce your stress by getting enough sleep, working on hobbies you enjoy and practicing relaxation or meditation. Talk to your care team about ways to manage your pain beyond prescription opioids.    These medicines have risks:    DO NOT drive when on new or higher doses of pain medicine. These medicines can affect your alertness and reaction times, and you could be arrested for driving under the influence (DUI). If you need to use opioids long-term, talk to your care team about driving.    DO NOT operate heavy machinery    DO NOT do any other dangerous activities while  taking these medicines.    DO NOT drink any alcohol while taking these medicines.     If the opioid prescribed includes acetaminophen, DO NOT take with any other medicines that contain acetaminophen. Read all labels carefully. Look for the word  acetaminophen  or  Tylenol.  Ask your pharmacist if you have questions or are unsure.    You can get addicted to pain medicines, especially if you have a history of addiction (chemical, alcohol or substance dependence). Talk to your care team about ways to reduce this risk.    All opioids tend to cause constipation. Drink plenty of water and eat foods that have a lot of fiber, such as fruits, vegetables, prune juice, apple juice and high-fiber cereal. Take a laxative (Miralax, milk of magnesia, Colace, Senna) if you don t move your bowels at least every other day. Other side effects include upset stomach, sleepiness, dizziness, throwing up, tolerance (needing more of the medicine to have the same effect), physical dependence and slowed breathing.    Store your pills in a secure place, locked if possible. We will not replace any lost or stolen medicine. If you don t finish your medicine, please throw away (dispose) as directed by your pharmacist. The [place] [employer] has more information about safe disposal: https://www.pca.state.[place]/living-green/managing-unwanted-medications        Prescriptions were sent or printed at these locations (1 Prescription)                   Other Prescriptions                Printed at Department/Unit printer (1 of 1)         HYDROcodone-acetaminophen (NORCO) 5-325 MG per tablet                Orders Needing Specimen Collection     None      Pending Results     Date and Time Order Name Status Description    9/3/2018 1606 BETA STREP GROUP A CULTURE In process             Pending Culture Results     Date and Time Order Name Status Description    9/3/2018 1606 BETA STREP GROUP A CULTURE In process             Pending Results  Instructions     If you had any lab results that were not finalized at the time of your Discharge, you can call the ED Lab Result RN at [phone]. You will be contacted by this team for any positive Lab results or changes in treatment. The nurses are available 7 days a week from 10A to 6:30P.  You can leave a message 24 hours per day and they will return your call.        Test Results From Your Hospital Stay               Clinical Quality Measure: Blood Pressure Screening     Your blood pressure was checked while you were in the emergency department today. The last reading we obtained was  BP: 124/66 . Please read the guidelines below about what these numbers mean and what you should do about them.  If your systolic blood pressure (the top number) is less than 120 and your diastolic blood pressure (the bottom number) is less than 80, then your blood pressure is normal. There is nothing more that you need to do about it.  If your systolic blood pressure (the top number) is 120-139 or your diastolic blood pressure (the bottom number) is 80-89, your blood pressure may be higher than it should be. You should have your blood pressure rechecked within a year by a primary care provider.  If your systolic blood pressure (the top number) is 140 or greater or your diastolic blood pressure (the bottom number) is 90 or greater, you may have high blood pressure. High blood pressure is treatable, but if left untreated over time it can put you at risk for heart attack, stroke, or kidney failure. You should have your blood pressure rechecked by a primary care provider within the next 4 weeks.  If your provider in the emergency department today gave you specific instructions to follow-up with your doctor or provider even sooner than that, you should follow that instruction and not wait for up to 4 weeks for your follow-up visit.        Thank you for choosing [facility]       Thank you for choosing [facility] for your care. Our goal  is always to provide you with excellent care. Hearing back from our patients is one way we can continue to improve our services. Please take a few minutes to complete the written survey that you may receive in the mail after you visit with us. Thank you!        [employer] Information     [employer] gives you secure access to your electronic health record. If you see a primary care provider, you can also send messages to your care team and make appointments. If you have questions, please call your primary care clinic.  If you do not have a primary care provider, please call [phone] and they will assist you.        Care EveryWhere ID     This is your Care EveryWhere ID. This could be used by other organizations to access your [facility] medical records  [id number]        Equal Access to Services     VIVIANA LUGO : Anyi Headley, lena biswas, neisha mac, yesenia bird. So [facility] [phone].    ATENCIÓN: Si habla español, tiene a khan disposición servicios gratuitos de asistencia lingüística. Llame al [phone].    We comply with applicable federal civil rights laws and [place] laws. We do not discriminate on the basis of race, color, national origin, age, disability, sex, sexual orientation, or gender identity.            After Visit Summary       This is your record. Keep this with you and show to your community pharmacist(s) and doctor(s) at your next visit.

## 2018-09-03 NOTE — ED PROVIDER NOTES
"  History     Chief Complaint:  Pharyngitis    HPI   Clark Crawford is a 23 year old female, with generalized anxiety disorder and an eating disorder, who presents with her grandmother to the emergency department for evaluation of pharyngitis for the last three days. Of note, patient has been evaluated for her symptoms, including sore throat and associated left ear ache and difficulty swallowing, twice in Urgent Care. Patient was diagnosed with mono two days ago and presented back to Urgent Care today, but was referred here for further evaluation and to rule out possible abscess. Of note, patient has had two strep tests and both returned negative. Patient denies any shortness of breath.     Allergies:  No Known Drug Allergies     Medications:    Nizoral  Mirena  Medrol dosepak  Deltasone  Zoloft    Past Medical History:    Eating disorder  ZEB    Past Surgical History:    History reviewed. No pertinent past surgical history.     Family History:    Anesthesia reaction  Pancreatic cancer    Social History:  The patient was accompanied to the ED by grandmother.  Smoking Status: No  Smokeless Tobacco: No  Alcohol Use: Yes   Marital Status:  Single [1]     Review of Systems   HENT: Positive for ear pain, sore throat and trouble swallowing.    Respiratory: Negative for shortness of breath.    All other systems reviewed and are negative.    Physical Exam   Vitals:  Patient Vitals for the past 24 hrs:   BP Temp Temp src Heart Rate Resp SpO2 Height Weight   09/03/18 1657 124/66 97.9  F (36.6  C) Oral 96 16 100 % 1.626 m (5' 4\") 52.4 kg (115 lb 9.6 oz)      Physical Exam  General: Alert, interactive. GCS 15  Head:  Scalp is atraumatic.   Eyes:  EOM intact. The pupils are equal, round, and reactive to light. No scleral icterus or conjunctivitis   ENT:                                      Ears:  The external ears are normal. TM's non-erythematous. External canals normal.    Nose:  The external nose is normal.  Throat:  The " oropharynx is erythematous with 3+ erythematous tonsils with exudate. Uvula midline, no evidence of abscess. Mucus membranes are moist.  Handling secretions. No submandibular fullness.     Neck:  Normal range of motion. There is no rigidity.   CV:  Regular rate and rhythm. No murmur. 2+ radial pulses  Resp:  Breath sounds are clear bilaterally. Non-labored, no retractions or accessory muscle use.  GI:  Abdomen is soft, no distension, no tenderness.   MS:  Normal range of motion.   Skin:  Warm and dry.   Neuro:   Strength and sensation grossly intact.   Psych: Awake. Alert.  Appropriate interactions.     Emergency Department Course     Emergency Department Course:  Nursing notes and vitals reviewed.    6:04 PM: I performed an exam of the patient as documented above. History obtained from patient.   6:33 PM: Patient was also evaluated by Dr. Negro Olivia. He performed an exam and looked at patient's throat to rule out possible peritonsillar abscess. No abscess was noted. I discussed plan of care with patient and patient will be discharged home.     I discussed the treatment plan with the patient. She expressed understanding of this plan and consented to discharge. She will be discharged home with instructions for care and follow up. In addition, the patient will return to the emergency department if her symptoms persist, worsen, if new symptoms arise or if there is any concern.  All questions were answered prior to discharge.    Impression & Plan      Medical Decision Making:  Clark Crawford is a 23 year old female who presents for evaluation of throat pain.  A broad infection differential diagnosis was considered including but not limited to Strep pharyngitis, meningitis, sepsis, UTI, dehydration, electrolyte abnormality. Patient tested positive at Urgent Care for mononucleosis two days ago, but was concerned for possible peritonsillar abscess, thus presented to the ED.  Signs and symptoms are consistent with  mononucleosis, no evidence of abscess at this time. Uvula midline. Patient is tolerating her secretions. I educated the patient and grandmother on the potential complications and the mode of transmission. She understands the risk of splenic rupture and that contact sports should be avoided for at least 3 weeks after symptoms improve or until cleared by a Primary MD. REturn precautions discussed including trouble breathing/swallowing, handling her secretions, abdominal pain, or any other new/concerning symptoms.     Diagnosis:     ICD-10-CM    1. Throat pain R07.0    2. Mononucleosis B27.90          Plan:  1. Return to the ED with new or worse symptoms as outlined in DC instructions  2. Follow up with your PMD in 1-2 days as needed otherwise in 1 weeks for ongoing evaluation and management  3. Provided with standard EPIC Discharge instructions for Mono         Diagnosis:    ICD-10-CM    1. Throat pain R07.0    2. Mononucleosis B27.90         Disposition:   Discharged.    Discharge Medications:  New Prescriptions    HYDROCODONE-ACETAMINOPHEN (NORCO) 5-325 MG PER TABLET    Take 1 tablet by mouth every 4 hours as needed for pain       Scribe Disclosure:  STACEY, Carli Garcia, am serving as a scribe at 6:04 PM on 9/3/2018 to document services personally performed by Natalie Ignacio PA-C, based on my observations and the provider's statements to me.  9/3/2018    EMERGENCY DEPARTMENT       Natalie Ignacio PA-C  09/04/18 1714

## 2018-09-03 NOTE — ED AVS SNAPSHOT
Emergency Department    [address] [place] [id number]    Phone:  [phone]    Fax:  [phone]                                       Clark Crawford   MRN: [id number]    Department:   Emergency Department   Date of Visit:  9/3/2018           After Visit Summary Signature Page     I have received my discharge instructions, and my questions have been answered. I have discussed any challenges I see with this plan with the nurse or doctor.    ..........................................................................................................................................  Patient/Patient Representative Signature      ..........................................................................................................................................  Patient Representative Print Name and Relationship to Patient    ..................................................               ................................................  Date                                            Time    ..........................................................................................................................................  Reviewed by Signature/Title    ...................................................              ..............................................  Date                                                            Time          22EPIC Rev 08/18

## 2018-09-03 NOTE — DISCHARGE INSTRUCTIONS
*Take ibuprofen 600 mg 3x per day.  This will provide both pain control and fight against inflammation. Norco for breakthough pain.   *Continue Prednisone as prescribed.       Mononucleosis  Mononucleosis (also called mono) is a contagious viral infection. Most infants and children exposed to the virus get only mild flu-like symptoms or no symptoms at all. However, infection is usually more serious in teens and young adults. While the virus is active it causes symptoms and can spread to others. After symptoms subside, the virus stays in the body and eventually becomes inactive. Once you have one case of mono, you are unlikely to develop symptoms again.  The virus is usually spread by contact with saliva, often by kissing. It may also spread by breastmilk, blood, or sexual contact. It takes about 4 to 6 weeks to develop symptoms after exposure.  Early symptoms include headache, nausea, tiredness and general muscle aching. This is followed by sore throat and fever. Lymph glands in the neck, under the arms, or in the groin may be swollen. Symptoms usually go away in about 1 to 2 months. But they can last up to four months.  If symptoms have been present less than 1 week or more than 3 weeks, the blood test used to diagnose this disease may be negative even though you have the illness.  In this case, other tests may be done.  Taking the antibiotics ampicillin or amoxicillin during a mono infection may cause a skin rash. This is not serious and will fade in about one week. The cause is a reaction of the drug with the virus.  Mono can cause your spleen to swell. The spleen is a fist-sized organ in the upper left abdomen that stores red blood cells. Injury to a swollen spleen can cause the spleen to rupture. This can cause life-threatening internal bleeding. To avoid this, do not play contact sports or perform strenuous activity for 8 weeks, or until cleared by your healthcare provider. A sharp blow could rupture a swollen  spleen  Home care    Rest in bed until the fever and weakness have gone away.    Drink plenty of fluids, but avoid alcohol. Otherwise, you may eat a regular diet.    Ask your healthcare provider about using over-the-counter medicines to treat symptoms such as fever, pain, or an itchy rash.    Over-the-counter throat lozenges may help soothe a sore throat. Gargling with warm salt water (1/2 teaspoon in 1 glass of warm water) may also be soothing to the throat.    You may return to work or school after the fever goes away and you are feeling better. Continue to follow any activity restrictions you have been given.  Preventing spread of the virus  To limit the spread of the virus, avoid exposing others to your saliva for at least 6 months after your illness (no kissing or sharing utensils, drinking glasses, or toothbrushes).  Follow-up care  Follow up with your healthcare provider within 1 to 2 weeks or as advised by our staff to be sure that there are no complications. If symptoms of extreme fatigue and swollen glands last longer than 6 months, see your healthcare provider for further testing.  When to seek medical advice  Call your healthcare provider right away if any of the following occur:    Excessive coughing    Yellow skin or eyes    Trouble swallowing  Call 911  Call 911 if any of the following occur:    Severe or worsening abdominal pain    Trouble breathing  Date Last Reviewed: 9/25/2015 2000-2017 [employer]. [address] [place] [id number]. All rights reserved. This information is not intended as a substitute for professional medical care. Always follow your healthcare professional's instructions.

## 2018-09-03 NOTE — LETTER
September 3, 2018      To Whom It May Concern:      Clark Crawford was seen in our Emergency Department today, 09/03/18.  I expect her condition to improve over the next 2-3 days.  She may return to work when improved.    Sincerely,        Natalie Ignacio PA-C

## 2018-09-03 NOTE — PROGRESS NOTES
SUBJECTIVE:  Clark Crawford is a 23 year old female with h/o recent mono is here  a chief complaint of  Worsening sore throat.  Onset of symptoms was 3 day(s) ago.    Course of illness: worsening.  Severity severe  Current and Associated symptoms: sore throat and painful swallowing   Treatment measures tried include Tylenol/Ibuprofen.  Predisposing factors include positive for mono .  She has not been able to swallow her own saliva as pain is worsening   Has no difficulty breathing but her throat feels swollen     Past Medical History:   Diagnosis Date     Eating disorder      Generalized anxiety disorder      Current Outpatient Prescriptions   Medication Sig Dispense Refill     predniSONE (DELTASONE) 20 MG tablet Take 1 tablet (20 mg) by mouth 2 times daily 10 tablet 0     predniSONE (DELTASONE) 20 MG tablet Take 2 tablets (40 mg) by mouth once for 1 dose 2 tablet 0     ketoconazole (NIZORAL) 2 % cream APPLY TO AFFECTED AREA(S) TWO TIMES A DAY FOR 2 TO 3 WEEKS 60 g 2     levonorgestrel (MIRENA) 20 MCG/24HR IUD 1 each (20 mcg) by Intrauterine route once for 1 dose 1 each 0     magic mouthwash (ENTER INGREDIENTS IN COMMENTS) suspension Swish and spit 5-10 mLs in mouth every 6 hours as needed compound  30 ml Benadryl (12.5 mg/5 ml), 60 ml Maalox and 30 ml Viscous Lidocaine 120 mL 0     methylPREDNISolone (MEDROL DOSEPAK) 4 MG tablet Follow package instructions 21 tablet 0     Sertraline HCl (ZOLOFT PO) Take 100 mg by mouth daily       Social History   Substance Use Topics     Smoking status: Never Smoker     Smokeless tobacco: Never Used     Alcohol use 1.8 oz/week     3 Standard drinks or equivalent per week      Comment: socially       ROS:  10 point ROS of systems including Constitutional, Eyes, Respiratory, Cardiovascular, Gastroenterology, Genitourinary, Integumentary, Muscularskeletal, Psychiatric were all negative except for pertinent positives noted in my HPI           OBJECTIVE:   BP 90/62  Pulse 103  Temp  99.6  F (37.6  C) (Oral)  Resp 12  Wt 115 lb (52.2 kg)  SpO2 100%  BMI 19.43 kg/m2  GENERAL APPEARANCE: healthy, alert and no distress  EYES: EOMI,  PERRL, conjunctiva clear  HENT: ear canals and TM's normal.  Nose normal.  Pharynx erythematous with enlarged tonsils and diffuse exudate   NECK: supple, non-tender to palpation, no adenopathy noted  RESP: lungs clear to auscultation - no rales, rhonchi or wheezes  CV: regular rates and rhythm, normal S1 S2, no murmur noted  ABDOMEN:  soft, nontender, no HSM or masses and bowel sounds normal  SKIN: no suspicious lesions or rashes    Rapid Strep test is negative; await throat culture results.    ASSESSMENT:      Sorethroat  Tonsillitis  Peritonsillar abscess  Dehydration      Clark was seen today for urgent care.    Diagnoses and all orders for this visit:    Sorethroat  -     Strep, Rapid Screen  -     Beta strep group A culture  -     Discontinue: azithromycin (ZITHROMAX) 250 MG tablet; Two tablets first day, then one tablet daily for four days.    Tonsillitis  -     Discontinue: azithromycin (ZITHROMAX) 250 MG tablet; Two tablets first day, then one tablet daily for four days.  -     predniSONE (DELTASONE) 20 MG tablet; Take 1 tablet (20 mg) by mouth 2 times daily  -     Discontinue: cefTRIAXone (ROCEPHIN) 1 GM vial; Inject 1 g (1,000 mg) into the muscle once for 1 dose  -     predniSONE (DELTASONE) 20 MG tablet; Take 2 tablets (40 mg) by mouth once for 1 dose    Peritonsillar abscess    Dehydration    as she was not doing well no antibiotic was given she was sent to ER   Pt was clinically looking very sick       PLAN:   See orders in epic.   Discussed with pt as its worsening and she is having difficulty swallowing   She would need to go to ER   She understood and agreed with plan   .

## 2018-09-04 LAB
BACTERIA SPEC CULT: NORMAL
SPECIMEN SOURCE: NORMAL

## 2018-09-04 NOTE — PROGRESS NOTES
SUBJECTIVE:  Clark Crawford is a 23 year old female with a chief complaint of sore throat.  Onset of symptoms was 2 day(s) ago.    Course of illness: still present.  Severity moderate  Current and Associated symptoms: fever, chills, sore throat and fatigue  Treatment measures tried include Tylenol/Ibuprofen.  Predisposing factors include none.    Past Medical History:   Diagnosis Date     Eating disorder      Generalized anxiety disorder      ALLERGIES  No Known Allergies     Social History   Substance Use Topics     Smoking status: Never Smoker     Smokeless tobacco: Never Used     Alcohol use 1.8 oz/week     3 Standard drinks or equivalent per week      Comment: socially       ROS:  CONSTITUTIONAL:NEGATIVE for fever, chills, change in weight  INTEGUMENTARY/SKIN: NEGATIVE for worrisome rashes, moles or lesions  ENT/MOUTH: POSITIVE for throat pain, swelling  NECK: Positive for swelling  RESP:NEGATIVE for significant cough or SOB  CV: NEGATIVE for chest pain, palpitations or peripheral edema  GI: NEGATIVE for nausea, abdominal pain, heartburn, or change in bowel habits  MUSCULOSKELETAL: NEGATIVE for significant arthralgias or myalgia  NEURO: NEGATIVE for weakness, dizziness or paresthesias    OBJECTIVE:   BP 98/68 (BP Location: Left arm, Patient Position: Sitting, Cuff Size: Adult Regular)  Pulse 58  Temp 98.7  F (37.1  C) (Oral)  Resp 12  Wt 122 lb (55.3 kg)  SpO2 100%  Breastfeeding? No  BMI 20.62 kg/m2  GENERAL APPEARANCE: healthy, alert and no distress  EYES: EOMI,  PERRL, conjunctiva clear  HENT: TM's normal bilaterally, tonsillar hypertrophy, tonsillar erythema and tonsillar exudate  NECK: cervical adenopathy posterior and anterior  RESP: lungs clear to auscultation - no rales, rhonchi or wheezes  CV: regular rates and rhythm, normal S1 S2, no murmur noted  ABDOMEN:  soft, nontender, no HSM or masses and bowel sounds normal  SKIN: no suspicious lesions or rashes    Results for orders placed or performed in  visit on 08/31/18   CBC with platelets differential   Result Value Ref Range    WBC 10.0 4.0 - 11.0 10e9/L    RBC Count 4.06 3.8 - 5.2 10e12/L    Hemoglobin 12.3 11.7 - 15.7 g/dL    Hematocrit 37.8 35.0 - 47.0 %    MCV 93 78 - 100 fl    MCH 30.3 26.5 - 33.0 pg    MCHC 32.5 31.5 - 36.5 g/dL    RDW 13.2 10.0 - 15.0 %    Platelet Count 115 (L) 150 - 450 10e9/L    Diff Method Automated Method     % Neutrophils 28.8 %    % Lymphocytes 60.3 %    % Monocytes 10.2 %    % Eosinophils 0.4 %    % Basophils 0.3 %    Absolute Neutrophil 2.9 1.6 - 8.3 10e9/L    Absolute Lymphocytes 6.0 (H) 0.8 - 5.3 10e9/L    Absolute Monocytes 1.0 0.0 - 1.3 10e9/L    Absolute Eosinophils 0.0 0.0 - 0.7 10e9/L    Absolute Basophils 0.0 0.0 - 0.2 10e9/L   Mononucleosis screen   Result Value Ref Range    Mononucleosis Screen Positive (A) NEG^Negative   Strep, Rapid Screen   Result Value Ref Range    Specimen Description Throat     Rapid Strep A Screen       NEGATIVE: No Group A streptococcal antigen detected by immunoassay, await culture report.   Beta strep group A culture   Result Value Ref Range    Specimen Description Throat     Culture Micro No beta hemolytic Streptococcus Group A isolated          ASSESSMENT/PLAN:      ICD-10-CM    1. Throat pain R07.0 Strep, Rapid Screen     Beta strep group A culture     CBC with platelets differential     Mononucleosis screen     magic mouthwash (ENTER INGREDIENTS IN COMMENTS) suspension   2. Mononucleosis B27.90 methylPREDNISolone (MEDROL DOSEPAK) 4 MG tablet     Symptomatic treat with gargles, lozenges, and OTC analgesic as needed. Follow-up with primary clinic if not improving.  Orders Placed This Encounter     CBC with platelets differential     Mononucleosis screen     methylPREDNISolone (MEDROL DOSEPAK) 4 MG tablet     magic mouthwash (ENTER INGREDIENTS IN COMMENTS) suspension       Contagious for 2 wks  No sports for 1 month  Follow up with PCP next week  Medrol for swelling  Go to the ED if symptoms  worsen

## 2018-09-06 ENCOUNTER — OFFICE VISIT (OUTPATIENT)
Dept: FAMILY MEDICINE | Facility: CLINIC | Age: 23
End: 2018-09-06

## 2018-09-06 VITALS
BODY MASS INDEX: 19.91 KG/M2 | DIASTOLIC BLOOD PRESSURE: 54 MMHG | HEART RATE: 70 BPM | SYSTOLIC BLOOD PRESSURE: 100 MMHG | WEIGHT: 116 LBS | TEMPERATURE: 98.4 F

## 2018-09-06 DIAGNOSIS — R07.0 THROAT PAIN: Primary | ICD-10-CM

## 2018-09-06 PROCEDURE — 99213 OFFICE O/P EST LOW 20 MIN: CPT | Performed by: FAMILY MEDICINE

## 2018-09-06 RX ORDER — AZITHROMYCIN 250 MG/1
TABLET, FILM COATED ORAL
Qty: 6 TABLET | Refills: 0 | Status: SHIPPED | OUTPATIENT
Start: 2018-09-06 | End: 2018-11-07

## 2018-09-06 NOTE — LETTER
To whom it may concern.      [place]      1995            Patient is seen in the clinic 9/6/2018. She is advised rest for 9/6/2018 and 9/7/2018. Increase hydration.                      Sincerely,         Ej Daniels MD   9/6/2018

## 2018-09-06 NOTE — MR AVS SNAPSHOT
After Visit Summary   9/6/2018    Clark Crawford    MRN: [id number]           Patient Information     Date Of Birth          1995        Visit Information        Provider Department      9/6/2018 2:40 PM Ej Daniels MD [facility] [place]        Today's Diagnoses     Throat pain    -  1       Follow-ups after your visit        Follow-up notes from your care team     Return in about 1 week (around 9/13/2018) for if symptom does not get better.      Who to contact     If you have questions or need follow up information about today's clinic visit or your schedule please contact [facility] [place] directly at [phone].  Normal or non-critical lab and imaging results will be communicated to you by MyChart, letter or phone within 4 business days after the clinic has received the results. If you do not hear from us within 7 days, please contact the clinic through Dajiabaohart or phone. If you have a critical or abnormal lab result, we will notify you by phone as soon as possible.  Submit refill requests through [employer] or call your pharmacy and they will forward the refill request to us. Please allow 3 business days for your refill to be completed.          Additional Information About Your Visit        MyChart Information     [employer] gives you secure access to your electronic health record. If you see a primary care provider, you can also send messages to your care team and make appointments. If you have questions, please call your primary care clinic.  If you do not have a primary care provider, please call [phone] and they will assist you.        Care EveryWhere ID     This is your Care EveryWhere ID. This could be used by other organizations to access your [place] medical records  [id number]        Your Vitals Were     Pulse Temperature BMI (Body Mass Index)             70 98.4  F (36.9  C) (Tympanic) 19.91 kg/m2          Blood Pressure from Last 3 Encounters:   09/06/18  100/54   09/03/18 124/66   09/03/18 90/62    Weight from Last 3 Encounters:   09/06/18 116 lb (52.6 kg)   09/03/18 115 lb 9.6 oz (52.4 kg)   09/03/18 115 lb (52.2 kg)              Today, you had the following     No orders found for display         Today's Medication Changes          These changes are accurate as of 9/6/18  3:21 PM.  If you have any questions, ask your nurse or doctor.               Start taking these medicines.        Dose/Directions    azithromycin 250 MG tablet   Commonly known as:  ZITHROMAX   Used for:  Throat pain   Started by:  Ej Daniels MD        Two tablets first day, then one tablet daily for four days.   Quantity:  6 tablet   Refills:  0            Where to get your medicines      These medications were sent to [facility] [place] [facility] [place], [address], [place] [address]     Phone:  [phone]     azithromycin 250 MG tablet                Primary Care Provider Office Phone # Fax #    Caro Ríos [phone]       [address] [place] [id number]        Equal Access to Services     VIVIANA LUGO AH: Hadii nabor ku hadasho Soomaali, waaxda luqadaha, qaybta kaalmada adeegyada, waxay tdin haycesarn odalis bird. So [facility] [phone].    ATENCIÓN: Si habla español, tiene a khan disposición servicios gratuitos de asistencia lingüística. Llame al [phone].    We comply with applicable federal civil rights laws and [place] laws. We do not discriminate on the basis of race, color, national origin, age, disability, sex, sexual orientation, or gender identity.            Thank you!     [facility]. Our goal is always to provide you with excellent care. Hearing back from our patients is one way we can continue to improve our services. Please take a few minutes to complete the written survey that you may receive in the mail after  your visit with us. Thank you!             Your Updated Medication List - Protect others around you: Learn how to safely use, store and throw away your medicines at www.disposemymeds.org.          This list is accurate as of 9/6/18  3:21 PM.  Always use your most recent med list.                   Brand Name Dispense Instructions for use Diagnosis    azithromycin 250 MG tablet    ZITHROMAX    6 tablet    Two tablets first day, then one tablet daily for four days.    Throat pain       HYDROcodone-acetaminophen 5-325 MG per tablet    NORCO    8 tablet    Take 1 tablet by mouth every 4 hours as needed for pain        ketoconazole 2 % cream    NIZORAL    60 g    APPLY TO AFFECTED AREA(S) TWO TIMES A DAY FOR 2 TO 3 WEEKS    Tinea versicolor       levonorgestrel 20 MCG/24HR IUD    MIRENA    1 each    1 each (20 mcg) by Intrauterine route once for 1 dose    Encounter for IUD insertion       magic mouthwash suspension    ENTER INGREDIENTS IN COMMENTS    120 mL    Swish and spit 5-10 mLs in mouth every 6 hours as needed compound  30 ml Benadryl (12.5 mg/5 ml), 60 ml Maalox and 30 ml Viscous Lidocaine    Throat pain       methylPREDNISolone 4 MG tablet    MEDROL DOSEPAK    21 tablet    Follow package instructions    Mononucleosis       predniSONE 20 MG tablet    DELTASONE    10 tablet    Take 1 tablet (20 mg) by mouth 2 times daily    Tonsillitis       ZOLOFT PO      Take 100 mg by mouth daily

## 2018-09-06 NOTE — PROGRESS NOTES
SUBJECTIVE:   Clark Crawford is a 23 year old female who presents to clinic today for the following health issues:      ED/UC Followup:    Facility:  [facility]  Date of visit: 8/31, 9/3/18  Reason for visit: throat pain   Current Status: feeling a lot better - throat pain has gone down      She denies any fever.  She was seen in urgent care and later in ER to rule out any tonsillar abscess.  She was diagnosed with mono.  Thinks her symptoms are improved denies any chest pain no shortness of breath.        Problem list and histories reviewed & adjusted, as indicated.  Additional history: as documented    Patient Active Problem List   Diagnosis     ZEB (generalized anxiety disorder)     Eating disorder     CARDIOVASCULAR SCREENING; LDL GOAL LESS THAN 160     Past Surgical History:   Procedure Laterality Date     NO HISTORY OF SURGERY         Social History   Substance Use Topics     Smoking status: Never Smoker     Smokeless tobacco: Never Used     Alcohol use 1.8 oz/week     3 Standard drinks or equivalent per week      Comment: socially     Family History   Problem Relation Age of Onset     Anesthesia Reaction Mother      nausea     Pancreatic Cancer Mother 47     alive     Anesthesia Reaction Maternal Grandmother      nausea     Depression Maternal Grandmother      past, not currently     Breast Cancer No family hx of      Colon Cancer No family hx of          Current Outpatient Prescriptions   Medication Sig Dispense Refill     azithromycin (ZITHROMAX) 250 MG tablet Two tablets first day, then one tablet daily for four days. 6 tablet 0     ketoconazole (NIZORAL) 2 % cream APPLY TO AFFECTED AREA(S) TWO TIMES A DAY FOR 2 TO 3 WEEKS 60 g 2     Sertraline HCl (ZOLOFT PO) Take 100 mg by mouth daily       HYDROcodone-acetaminophen (NORCO) 5-325 MG per tablet Take 1 tablet by mouth every 4 hours as needed for pain (Patient not taking: Reported on 9/6/2018) 8 tablet 0     levonorgestrel (MIRENA) 20 MCG/24HR IUD 1 each (20 mcg)  by Intrauterine route once for 1 dose 1 each 0     magic mouthwash (ENTER INGREDIENTS IN COMMENTS) suspension Swish and spit 5-10 mLs in mouth every 6 hours as needed compound  30 ml Benadryl (12.5 mg/5 ml), 60 ml Maalox and 30 ml Viscous Lidocaine (Patient not taking: Reported on 9/6/2018) 120 mL 0     methylPREDNISolone (MEDROL DOSEPAK) 4 MG tablet Follow package instructions (Patient not taking: Reported on 9/6/2018) 21 tablet 0     predniSONE (DELTASONE) 20 MG tablet Take 1 tablet (20 mg) by mouth 2 times daily (Patient not taking: Reported on 9/6/2018) 10 tablet 0       Reviewed and updated as needed this visit by clinical staff       Reviewed and updated as needed this visit by Provider         ROS:  CONSTITUTIONAL: NEGATIVE for fever, chills, change in weight  ENT/MOUTH: POSITIVE for sore throat  RESP: NEGATIVE for significant cough or SOB  CV: NEGATIVE for chest pain, palpitations or peripheral edema    OBJECTIVE:                                                    /54 (BP Location: Left arm, Patient Position: Chair, Cuff Size: Adult Regular)  Pulse 70  Temp 98.4  F (36.9  C) (Tympanic)  Wt 116 lb (52.6 kg)  BMI 19.91 kg/m2  Body mass index is 19.91 kg/(m^2).   GENERAL: healthy, alert, well nourished, well hydrated, no distress  HENT: ear canals- normal; TMs- normal; uvula is midline.  Her tonsils are not enlarged but she has some membrane on top of that.  No cervical lymphedema  NECK: no tenderness, no adenopathy, no asymmetry, no masses, no stiffness; thyroid- normal to palpation  RESP: lungs clear to auscultation - no rales, no rhonchi, no wheezes  CV: regular rates and rhythm, normal S1 S2, no S3 or S4 and no murmur, no click or rub -         ASSESSMENT/PLAN:                                                        ICD-10-CM    1. Throat pain R07.0 azithromycin (ZITHROMAX) 250 MG tablet       Patient symptoms are currently improving.  I advised her to continue symptomatic care with ibuprofen and  saltwater gargles.  She is advised just for today and tomorrow and then she can go back to school on Monday.  I will suggest not to participate in any active sports which she understands.  Azithromycin prescription is given, if her symptoms worsen or any fever develop then she should start taking antibiotic prophylactically.  Otherwise she can hold on antibiotic.    Ej Daniels MD  [facility]

## 2018-10-04 ENCOUNTER — ALLIED HEALTH/NURSE VISIT (OUTPATIENT)
Dept: NURSING | Facility: CLINIC | Age: 23
End: 2018-10-04
Payer: COMMERCIAL

## 2018-10-04 DIAGNOSIS — Z23 NEED FOR PROPHYLACTIC VACCINATION AND INOCULATION AGAINST INFLUENZA: Primary | ICD-10-CM

## 2018-10-04 DIAGNOSIS — Z11.1 SCREENING EXAMINATION FOR PULMONARY TUBERCULOSIS: ICD-10-CM

## 2018-10-04 PROCEDURE — 90472 IMMUNIZATION ADMIN EACH ADD: CPT

## 2018-10-04 PROCEDURE — 86580 TB INTRADERMAL TEST: CPT

## 2018-10-04 PROCEDURE — 90686 IIV4 VACC NO PRSV 0.5 ML IM: CPT

## 2018-10-04 PROCEDURE — 90471 IMMUNIZATION ADMIN: CPT

## 2018-10-04 NOTE — MR AVS SNAPSHOT
After Visit Summary   10/4/2018    Clark Crawford    MRN: [id number]           Patient Information     Date Of Birth          1995        Visit Information        Provider Department      10/4/2018 3:00 PM [facility]        Today's Diagnoses     Need for prophylactic vaccination and inoculation against influenza    -  1    Screening examination for pulmonary tuberculosis           Follow-ups after your visit        Who to contact     If you have questions or need follow up information about today's clinic visit or your schedule please contact [facility] directly at [phone].  Normal or non-critical lab and imaging results will be communicated to you by [employer], letter or phone within 4 business days after the clinic has received the results. If you do not hear from us within 7 days, please contact the clinic through [employer] or phone. If you have a critical or abnormal lab result, we will notify you by phone as soon as possible.  Submit refill requests through [employer] or call your pharmacy and they will forward the refill request to us. Please allow 3 business days for your refill to be completed.          Additional Information About Your Visit        MyChart Information     [employer] gives you secure access to your electronic health record. If you see a primary care provider, you can also send messages to your care team and make appointments. If you have questions, please call your primary care clinic.  If you do not have a primary care provider, please call [phone] and they will assist you.        Care EveryWhere ID     This is your Care EveryWhere ID. This could be used by other organizations to access your [place] medical records  [id number]         Blood Pressure from Last 3 Encounters:   09/06/18 100/54   09/03/18 124/66   09/03/18 90/62    Weight from Last 3 Encounters:   09/06/18 116 lb (52.6 kg)   09/03/18 115 lb 9.6 oz  (52.4 kg)   09/03/18 115 lb (52.2 kg)              We Performed the Following     EACH ADD'L VACCINE ADMINISTRATION     FLU VACCINE, SPLIT VIRUS, IM (QUADRIVALENT) [22018]- >3 YRS     TB INTRADERMAL TEST     Vaccine Administration, Initial [32494]        Primary Care Provider Office Phone # Fax #    Caro Ríos [phone]       [address] [facility] [address]        Equal Access to Services     REAL LUGO : Hadii aad ku hadasho Soomaali, waaxda luqadaha, qaybta kaalmada adeegyada, waxay idiin hayaan adeeg kharash lareta . So [facility] [phone].    ATENCIÓN: Si habla español, tiene a khan disposición servicios gratuitos de asistencia lingüística. [facility] [phone].    We comply with applicable federal civil rights laws and [place] laws. We do not discriminate on the basis of race, color, national origin, age, disability, sex, sexual orientation, or gender identity.            Thank you!     Thank you for choosing [facility]  for your care. Our goal is always to provide you with excellent care. Hearing back from our patients is one way we can continue to improve our services. Please take a few minutes to complete the written survey that you may receive in the mail after your visit with us. Thank you!             Your Updated Medication List - Protect others around you: Learn how to safely use, store and throw away your medicines at www.disposemymeds.org.          This list is accurate as of 10/4/18  3:36 PM.  Always use your most recent med list.                   Brand Name Dispense Instructions for use Diagnosis    azithromycin 250 MG tablet    ZITHROMAX    6 tablet    Two tablets first day, then one tablet daily for four days.    Throat pain       HYDROcodone-acetaminophen 5-325 MG per tablet    NORCO    8 tablet    Take 1 tablet by mouth every 4 hours as needed for pain        ketoconazole 2 % cream    NIZORAL    60 g    APPLY TO AFFECTED AREA(S)  TWO TIMES A DAY FOR 2 TO 3 WEEKS    Tinea versicolor       levonorgestrel 20 MCG/24HR IUD    MIRENA    1 each    1 each (20 mcg) by Intrauterine route once for 1 dose    Encounter for IUD insertion       magic mouthwash suspension    ENTER INGREDIENTS IN COMMENTS    120 mL    Swish and spit 5-10 mLs in mouth every 6 hours as needed compound  30 ml Benadryl (12.5 mg/5 ml), 60 ml Maalox and 30 ml Viscous Lidocaine    Throat pain       methylPREDNISolone 4 MG tablet    MEDROL DOSEPAK    21 tablet    Follow package instructions    Mononucleosis       predniSONE 20 MG tablet    DELTASONE    10 tablet    Take 1 tablet (20 mg) by mouth 2 times daily    Tonsillitis       ZOLOFT PO      Take 100 mg by mouth daily

## 2018-10-04 NOTE — NURSING NOTE
Prior to injection verified patient identity using patient's name and date of birth.  Due to injection administration, patient instructed to remain in clinic for 15 minutes  afterwards, and to report any adverse reaction to me immediately. Vaccine information supplied.      Injectable Influenza Immunization Documentation    1.  Is the person to be vaccinated sick today?   No    2. Does the person to be vaccinated have an allergy to a component   of the vaccine?   No  Egg Allergy Algorithm Link    3. Has the person to be vaccinated ever had a serious reaction   to influenza vaccine in the past?   No    4. Has the person to be vaccinated ever had Guillain-Barré syndrome?   No    Form completed by Enoch Thibodeaux    The patient is asked the following questions today and these are her answers:    -Have you had a mantoux administered in the past 30 days?    No  -Have you had a previous positive Mantoux.  No  -Have you received BCG in the past.  No  -Have you had a live vaccine  (MMR, Varicella, OPV, Yellow Fever) in the last 6 weeks.  No  -Have you had and active  viral or bacterial infection in the past 6 weeks.  No  -Have you received corticosteroids or immunosuppressive agents in the past 6 weeks.  No  -Have you been diagnosed with HIV?  No  -Do you have a maglinancy?  No     Enoch Blanca MA on 10/4/2018 at 3:22 PM

## 2018-10-06 ENCOUNTER — OFFICE VISIT (OUTPATIENT)
Dept: URGENT CARE | Facility: URGENT CARE | Age: 23
End: 2018-10-06
Payer: COMMERCIAL

## 2018-10-06 DIAGNOSIS — Z11.1 VISIT FOR MANTOUX TEST: Primary | ICD-10-CM

## 2018-10-06 LAB
PPDINDURATION: NORMAL MM (ref 0–5)
PPDREDNESS: NORMAL MM

## 2018-10-06 PROCEDURE — 99213 OFFICE O/P EST LOW 20 MIN: CPT | Performed by: FAMILY MEDICINE

## 2018-10-06 NOTE — PROGRESS NOTES
Patient presents for Mantoux reading at 48 hours today        Mantoux results:   No induration.  No swelling.  Pinpoint dot of redness, slight induration 1 mm    Reading documented on patient's paperwork and signed.  Results entered into EPIC        Renee Turner MD

## 2018-10-06 NOTE — MR AVS SNAPSHOT
After Visit Summary   10/6/2018    Clark Crawford    MRN: [id number]           Patient Information     Date Of Birth          1995        Visit Information        Provider Department      10/6/2018 11:25 AM Renee Turner MD [facility]        Today's Diagnoses     Visit for Mantoux test    -  1      Care Instructions    Paperwork completed, signed, original  back to patient and  copy completed to be scanned into chart.       Renee Turner MD              Follow-ups after your visit        Who to contact     If you have questions or need follow up information about today's clinic visit or your schedule please contact [facility] directly at [phone].  Normal or non-critical lab and imaging results will be communicated to you by MyChart, letter or phone within 4 business days after the clinic has received the results. If you do not hear from us within 7 days, please contact the clinic through crobohart or phone. If you have a critical or abnormal lab result, we will notify you by phone as soon as possible.  Submit refill requests through [employer] or call your pharmacy and they will forward the refill request to us. Please allow 3 business days for your refill to be completed.          Additional Information About Your Visit        MyChart Information     [employer] gives you secure access to your electronic health record. If you see a primary care provider, you can also send messages to your care team and make appointments. If you have questions, please call your primary care clinic.  If you do not have a primary care provider, please call [phone] and they will assist you.        Care EveryWhere ID     This is your Care EveryWhere ID. This could be used by other organizations to access your [place] medical records  [id number]         Blood Pressure from Last 3 Encounters:   09/06/18 100/54   09/03/18 124/66   09/03/18 90/62    Weight from Last 3  Encounters:   09/06/18 116 lb (52.6 kg)   09/03/18 115 lb 9.6 oz (52.4 kg)   09/03/18 115 lb (52.2 kg)              Today, you had the following     No orders found for display       Primary Care Provider Office Phone # Fax #    Caro íRos [phone]       [address] [facility] [address]        Equal Access to Services     VIVIANA LUGO : Hadii aad ku hadasho Soomaali, waaxda luqadaha, qaybta kaalmada adeegyada, waxay idiin hayaan adeeg kharash la'aan . So [facility] [phone].    ATENCIÓN: Si habla español, tiene a khan disposición servicios gratuitos de asistencia lingüística. [facility] [phone].    We comply with applicable federal civil rights laws and [place] laws. We do not discriminate on the basis of race, color, national origin, age, disability, sex, sexual orientation, or gender identity.            Thank you!     Thank you for choosing [facility]  for your care. Our goal is always to provide you with excellent care. Hearing back from our patients is one way we can continue to improve our services. Please take a few minutes to complete the written survey that you may receive in the mail after your visit with us. Thank you!             Your Updated Medication List - Protect others around you: Learn how to safely use, store and throw away your medicines at www.disposemymeds.org.          This list is accurate as of 10/6/18  3:05 PM.  Always use your most recent med list.                   Brand Name Dispense Instructions for use Diagnosis    azithromycin 250 MG tablet    ZITHROMAX    6 tablet    Two tablets first day, then one tablet daily for four days.    Throat pain       HYDROcodone-acetaminophen 5-325 MG per tablet    NORCO    8 tablet    Take 1 tablet by mouth every 4 hours as needed for pain        ketoconazole 2 % cream    NIZORAL    60 g    APPLY TO AFFECTED AREA(S) TWO TIMES A DAY FOR 2 TO 3 WEEKS    Tinea versicolor        levonorgestrel 20 MCG/24HR IUD    MIRENA    1 each    1 each (20 mcg) by Intrauterine route once for 1 dose    Encounter for IUD insertion       magic mouthwash suspension    ENTER INGREDIENTS IN COMMENTS    120 mL    Swish and spit 5-10 mLs in mouth every 6 hours as needed compound  30 ml Benadryl (12.5 mg/5 ml), 60 ml Maalox and 30 ml Viscous Lidocaine    Throat pain       methylPREDNISolone 4 MG tablet    MEDROL DOSEPAK    21 tablet    Follow package instructions    Mononucleosis       predniSONE 20 MG tablet    DELTASONE    10 tablet    Take 1 tablet (20 mg) by mouth 2 times daily    Tonsillitis       ZOLOFT PO      Take 100 mg by mouth daily

## 2018-10-06 NOTE — PATIENT INSTRUCTIONS
Paperwork completed, signed, original  back to patient and  copy completed to be scanned into chart.       Renee Turner MD

## 2018-11-07 ENCOUNTER — OFFICE VISIT (OUTPATIENT)
Dept: FAMILY MEDICINE | Facility: CLINIC | Age: 23
End: 2018-11-07
Payer: COMMERCIAL

## 2018-11-07 VITALS
SYSTOLIC BLOOD PRESSURE: 98 MMHG | HEIGHT: 64 IN | RESPIRATION RATE: 14 BRPM | WEIGHT: 118 LBS | BODY MASS INDEX: 20.14 KG/M2 | HEART RATE: 70 BPM | DIASTOLIC BLOOD PRESSURE: 70 MMHG | TEMPERATURE: 98.3 F

## 2018-11-07 DIAGNOSIS — L02.92 FURUNCLE OF SKIN OR SUBCUTANEOUS TISSUE: Primary | ICD-10-CM

## 2018-11-07 PROCEDURE — 99213 OFFICE O/P EST LOW 20 MIN: CPT | Performed by: INTERNAL MEDICINE

## 2018-11-07 PROCEDURE — 87529 HSV DNA AMP PROBE: CPT | Performed by: INTERNAL MEDICINE

## 2018-11-07 RX ORDER — MUPIROCIN 20 MG/G
OINTMENT TOPICAL 3 TIMES DAILY
Qty: 22 G | Refills: 0 | Status: SHIPPED | OUTPATIENT
Start: 2018-11-07 | End: 2018-11-12

## 2018-11-07 ASSESSMENT — ENCOUNTER SYMPTOMS
DYSURIA: 0
HEMATURIA: 0
FEVER: 0

## 2018-11-07 NOTE — PATIENT INSTRUCTIONS
consistent with boil  treatment Bactroban antibiotics   If not improved ,then can prescribe oral antibiotics     Herpes test sent  Will call to start on valtrex if positive     Call or return to clinic if symptoms worsen or fail to improve as anticipated.    Herpes: Caring for Sores     Warm baths can ease itching and burning caused by sores.   Good hygiene matters when you have herpes. Take care of your sores to speed healing. Neglected sores can lead to other infections.  To ease your symptoms    Take any medicines as directed.    Take acetaminophen or ibuprofen for pain.    Take warm or cool baths to relieve itching of sores. And don t share towels when you have a sore.    Urinate in a tub of warm water to prevent burning. This works for women with genital herpes.    Don t wear tight clothes or nylon underwear. They can trap moisture, cause chafing, and prevent sores from healing.  To speed healing    Wash the sores with mild soap and water. And wash your hands after you touch a sore.    Dry the affected area completely by patting a towel over it. Don't rub. Don't share towels.    Don t bandage sores. The dry air helps them heal.    Avoid ointments unless they are prescribed. They retain moisture and may cause other infections.    Don t pick at the sores. This can slow healing.    Don t touch your eyes when you have a sore. The virus may travel from your fingertips to your eyes.  Resources  American [employer] Association STI Hotline  242.735.5791  www.ashastd.org  Centers for Disease Control and Prevention  946.368.1289  www.cdc.gov/std   Date Last Reviewed: 1/1/2017 2000-2018 [employer]. [address] [place] [address], [place] [address]. All rights reserved. This information is not intended as a substitute for professional medical care. Always follow your healthcare professional's instructions.        Herpes  If you have herpes, you re not alone. Millions of Americans have it. Herpes has no cure.  But you can control it and learn how to protect yourself and others from outbreaks.  What is herpes?  Herpes is a chronic (lifelong) virus. It can cause sores and discomfort. You get it from contact with someone who carries the virus. If sores occur on the lips, you have oral herpes. If sores occur on the penis or around the vagina, you have genital herpes.  Herpes outbreaks    The first outbreak of herpes sores is usually the most severe. Then, the soldiers of the body s immune system, white blood cells, produce antibodies. These antibodies help neutralize the herpes virus and may help make future attacks less severe.    Some people have only one outbreak of sores. Some people have periods of frequent outbreaks (every few weeks). Outbreaks of herpes sores usually happen less often over time.    Herpes sores may appear without a cause. Outbreaks are more likely when the immune system is weak. Other viral infections (such as a cold) can cause outbreaks. Stress from a poor diet, fatigue, or emotional upset can lead to outbreaks of sores. Exposure to strong sunlight often causes herpes sores to reappear.   To help prevent outbreaks    To prevent oral herpes outbreaks, avoid overexposure to wind, sun, and extreme temperatures. Use sunscreen and lip balm on affected areas.    If you are having frequent outbreaks, ask your healthcare provider about medicines that can help prevent outbreaks.  How herpes spreads to others  Herpes can be spread during an outbreak. But even without sores present, you can still  shed  the virus and infect others. You can take steps to prevent this.  To protect yourself and others    If you have an oral sore, avoid kissing and oral-genital contact.    If you have a genital sore, avoid intercourse. Also avoid oral-genital contact.    Wash your hands after touching a sore.    Use a condom each time you have sex. You can pass the virus even when sores aren t present. If you re unsure about the  timing of certain kinds of physical contact, ask your health care provider.    Tell any new partners that you have herpes.    If you re a woman, have Pap tests as often as your healthcare provider recommends.    A woman can spread herpes to their  during the birth process, whether or not they have an active genital sore. If pregnant, don't forget to tell your healthcare provider early in the pregnancy.     In some cases, daily antiviral medicine (acyclovir, famcyclovir, or valavyclovir), in addition to consistent condom use, may reduce your chances of spreading herpes to an uninfected partner. Ask your healthcare provider if this medicine would be helpful for you.  Resources  American Social Health Association STD Hotline  837.817.7783  www.ashastd.org  Centers for Disease Control and Prevention  722.835.5427  www.cdc.gov/std   Date Last Reviewed: 2016-2018 [employer]. [address] [place] [address], [place] [address]. All rights reserved. This information is not intended as a substitute for professional medical care. Always follow your healthcare professional's instructions.

## 2018-11-07 NOTE — MR AVS SNAPSHOT
After Visit Summary   11/7/2018    Clark Crawford    MRN: [id number]           Patient Information     Date Of Birth          1995        Visit Information        Provider Department      11/7/2018 4:00 PM Roslyn Pike MD [facility]        Today's Diagnoses     Furuncle of skin or subcutaneous tissue    -  1      Care Instructions    consistent with boil  treatment Bactroban antibiotics   If not improved ,then can prescribe oral antibiotics     Herpes test sent  Will call to start on valtrex if positive     Call or return to clinic if symptoms worsen or fail to improve as anticipated.            Follow-ups after your visit        Who to contact     If you have questions or need follow up information about today's clinic visit or your schedule please contact [facility] directly at [phone].  Normal or non-critical lab and imaging results will be communicated to you by MyChart, letter or phone within 4 business days after the clinic has received the results. If you do not hear from us within 7 days, please contact the clinic through MyChart or phone. If you have a critical or abnormal lab result, we will notify you by phone as soon as possible.  Submit refill requests through [employer] or call your pharmacy and they will forward the refill request to us. Please allow 3 business days for your refill to be completed.          Additional Information About Your Visit        MyChart Information     [employer] gives you secure access to your electronic health record. If you see a primary care provider, you can also send messages to your care team and make appointments. If you have questions, please call your primary care clinic.  If you do not have a primary care provider, please call [phone] and they will assist you.        Care EveryWhere ID     This is your Care EveryWhere ID. This could be used by other organizations to access your [facility]  "records  [id number]        Your Vitals Were     Pulse Temperature Respirations Height Breastfeeding? BMI (Body Mass Index)    70 98.3  F (36.8  C) (Oral) 14 5' 4\" (1.626 m) No 20.25 kg/m2       Blood Pressure from Last 3 Encounters:   11/07/18 98/70   09/06/18 100/54   09/03/18 124/66    Weight from Last 3 Encounters:   11/07/18 118 lb (53.5 kg)   09/06/18 116 lb (52.6 kg)   09/03/18 115 lb 9.6 oz (52.4 kg)              We Performed the Following     HSV 1 and 2 DNA by PCR          Today's Medication Changes          These changes are accurate as of 11/7/18  4:34 PM.  If you have any questions, ask your nurse or doctor.               Start taking these medicines.        Dose/Directions    mupirocin 2 % ointment   Commonly known as:  BACTROBAN   Used for:  Furuncle of skin or subcutaneous tissue   Started by:  Roslyn Pike MD        Apply topically 3 times daily for 5 days   Quantity:  22 g   Refills:  0         Stop taking these medicines if you haven't already. Please contact your care team if you have questions.     azithromycin 250 MG tablet   Commonly known as:  ZITHROMAX   Stopped by:  Roslyn Pike MD                Where to get your medicines      These medications were sent to [facility] - [place], [place] - [address]  [address], [place] [address]     Phone:  [phone]     mupirocin 2 % ointment                Primary Care Provider Office Phone # Fax #    Caro Ríos [phone]       [address] [facility] [address]        Equal Access to Services     VIVIANA LUGO AH: Anyi Headley, lena biwsas, qaciscota kaalmada ademaryyada, yesenia bird. So [facility] [phone].    ATENCIÓN: Si habla español, tiene a khan disposición servicios gratuitos de asistencia lingüística. Llame al [phone].    We comply with applicable federal civil rights laws and [place] laws. We do not " discriminate on the basis of race, color, national origin, age, disability, sex, sexual orientation, or gender identity.            Thank you!     Thank you for choosing [facility]  for your care. Our goal is always to provide you with excellent care. Hearing back from our patients is one way we can continue to improve our services. Please take a few minutes to complete the written survey that you may receive in the mail after your visit with us. Thank you!             Your Updated Medication List - Protect others around you: Learn how to safely use, store and throw away your medicines at www.disposemymeds.org.          This list is accurate as of 11/7/18  4:34 PM.  Always use your most recent med list.                   Brand Name Dispense Instructions for use Diagnosis    HYDROcodone-acetaminophen 5-325 MG per tablet    NORCO    8 tablet    Take 1 tablet by mouth every 4 hours as needed for pain        ketoconazole 2 % cream    NIZORAL    60 g    APPLY TO AFFECTED AREA(S) TWO TIMES A DAY FOR 2 TO 3 WEEKS    Tinea versicolor       levonorgestrel 20 MCG/24HR IUD    MIRENA    1 each    1 each (20 mcg) by Intrauterine route once for 1 dose    Encounter for IUD insertion       magic mouthwash suspension    ENTER INGREDIENTS IN COMMENTS    120 mL    Swish and spit 5-10 mLs in mouth every 6 hours as needed compound  30 ml Benadryl (12.5 mg/5 ml), 60 ml Maalox and 30 ml Viscous Lidocaine    Throat pain       methylPREDNISolone 4 MG tablet    MEDROL DOSEPAK    21 tablet    Follow package instructions    Mononucleosis       mupirocin 2 % ointment    BACTROBAN    22 g    Apply topically 3 times daily for 5 days    Furuncle of skin or subcutaneous tissue       predniSONE 20 MG tablet    DELTASONE    10 tablet    Take 1 tablet (20 mg) by mouth 2 times daily    Tonsillitis       ZOLOFT PO      Take 100 mg by mouth daily

## 2018-11-07 NOTE — PROGRESS NOTES
SUBJECTIVE:   Clark Crawford is a 23 year old female presenting with a chief complaint of   Chief Complaint   Patient presents with     Derm Problem     pimple like spots in groin area. slightly painful to the touch. Does run a lot, gets irritated in underwear lines. Tested for STD's in August. started 2 days ago.        She is an established patient of [facility].      Concerned about herpes.  Boil like lesions on inner thighs.  2.  Present 2 days  Painful to touch    Working out on treadmill.  Chaffing.    No treatment tried.    Hx ingrown hairs.    New partner.    [facility]    Review of Systems   Constitutional: Negative for fever.   Genitourinary: Negative for dysuria, hematuria and vaginal discharge.     IUD    Past Medical History:   Diagnosis Date     Eating disorder      Generalized anxiety disorder      Family History   Problem Relation Age of Onset     Anesthesia Reaction Mother      nausea     Pancreatic Cancer Mother 47     alive     Anesthesia Reaction Maternal Grandmother      nausea     Depression Maternal Grandmother      past, not currently     Breast Cancer No family hx of      Colon Cancer No family hx of      Current Outpatient Prescriptions   Medication Sig Dispense Refill     levonorgestrel (MIRENA) 20 MCG/24HR IUD 1 each (20 mcg) by Intrauterine route once for 1 dose 1 each 0     mupirocin (BACTROBAN) 2 % ointment Apply topically 3 times daily for 5 days 22 g 0     Sertraline HCl (ZOLOFT PO) Take 100 mg by mouth daily       HYDROcodone-acetaminophen (NORCO) 5-325 MG per tablet Take 1 tablet by mouth every 4 hours as needed for pain (Patient not taking: Reported on 9/6/2018) 8 tablet 0     ketoconazole (NIZORAL) 2 % cream APPLY TO AFFECTED AREA(S) TWO TIMES A DAY FOR 2 TO 3 WEEKS 60 g 2     magic mouthwash (ENTER INGREDIENTS IN COMMENTS) suspension Swish and spit 5-10 mLs in mouth every 6 hours as needed compound  30 ml Benadryl (12.5 mg/5 ml), 60 ml Maalox and 30 ml  "Viscous Lidocaine (Patient not taking: Reported on 9/6/2018) 120 mL 0     methylPREDNISolone (MEDROL DOSEPAK) 4 MG tablet Follow package instructions (Patient not taking: Reported on 9/6/2018) 21 tablet 0     predniSONE (DELTASONE) 20 MG tablet Take 1 tablet (20 mg) by mouth 2 times daily (Patient not taking: Reported on 9/6/2018) 10 tablet 0     Social History   Substance Use Topics     Smoking status: Never Smoker     Smokeless tobacco: Never Used     Alcohol use 1.8 oz/week     3 Standard drinks or equivalent per week      Comment: socially       OBJECTIVE  BP 98/70  Pulse 70  Temp 98.3  F (36.8  C) (Oral)  Resp 14  Ht 5' 4\" (1.626 m)  Wt 118 lb (53.5 kg)  Breastfeeding? No  BMI 20.25 kg/m2    Physical Exam   Constitutional: She appears well-developed and well-nourished.   Genitourinary:   Genitourinary Comments: right  inner thigh - old healed red area lesion  left inner thigh - red lesion with white pus blister.    11 blade used to open & obtain HSV test   Vitals reviewed.      Labs:  No results found for this or any previous visit (from the past 24 hour(s)).        ASSESSMENT:      ICD-10-CM    1. Furuncle of skin or subcutaneous tissue L02.92 mupirocin (BACTROBAN) 2 % ointment     HSV 1 and 2 DNA by PCR        Medical Decision Making:    Differential Diagnosis:  Small boil/abscess  HSV      PLAN:  bactroban  Ok to leave message cell if positive hsv  Would start valtrex if positive       Followup:  Patient Instructions   consistent with boil  treatment Bactroban antibiotics   If not improved ,then can prescribe oral antibiotics     Herpes test sent  Will call to start on valtrex if positive     Call or return to clinic if symptoms worsen or fail to improve as anticipated.       Inform handouts given on HSV  "

## 2018-11-08 LAB
HSV1 DNA SPEC QL NAA+PROBE: NEGATIVE
HSV2 DNA SPEC QL NAA+PROBE: NEGATIVE
SPECIMEN SOURCE: NORMAL

## 2018-12-04 ENCOUNTER — TELEPHONE (OUTPATIENT)
Dept: URGENT CARE | Facility: URGENT CARE | Age: 23
End: 2018-12-04

## 2019-03-08 ENCOUNTER — OFFICE VISIT (OUTPATIENT)
Dept: FAMILY MEDICINE | Facility: CLINIC | Age: 24
End: 2019-03-08
Payer: COMMERCIAL

## 2019-03-08 VITALS
DIASTOLIC BLOOD PRESSURE: 62 MMHG | SYSTOLIC BLOOD PRESSURE: 90 MMHG | OXYGEN SATURATION: 99 % | TEMPERATURE: 98.8 F | BODY MASS INDEX: 21.11 KG/M2 | WEIGHT: 123 LBS | HEART RATE: 60 BPM | RESPIRATION RATE: 14 BRPM

## 2019-03-08 DIAGNOSIS — R39.9 URINARY TRACT INFECTION SYMPTOMS: Primary | ICD-10-CM

## 2019-03-08 DIAGNOSIS — N94.9 VAGINAL SYMPTOM: ICD-10-CM

## 2019-03-08 LAB
ALBUMIN UR-MCNC: NEGATIVE MG/DL
APPEARANCE UR: ABNORMAL
BACTERIA #/AREA URNS HPF: ABNORMAL /HPF
BILIRUB UR QL STRIP: NEGATIVE
COLOR UR AUTO: YELLOW
GLUCOSE UR STRIP-MCNC: NEGATIVE MG/DL
HGB UR QL STRIP: NEGATIVE
KETONES UR STRIP-MCNC: NEGATIVE MG/DL
LEUKOCYTE ESTERASE UR QL STRIP: ABNORMAL
NITRATE UR QL: NEGATIVE
NON-SQ EPI CELLS #/AREA URNS LPF: ABNORMAL /LPF
PH UR STRIP: 5.5 PH (ref 5–7)
RBC #/AREA URNS AUTO: ABNORMAL /HPF
SOURCE: ABNORMAL
SP GR UR STRIP: >1.03 (ref 1–1.03)
SPECIMEN SOURCE: NORMAL
UROBILINOGEN UR STRIP-ACNC: 0.2 EU/DL (ref 0.2–1)
WBC #/AREA URNS AUTO: ABNORMAL /HPF
WET PREP SPEC: NORMAL

## 2019-03-08 PROCEDURE — 99213 OFFICE O/P EST LOW 20 MIN: CPT | Performed by: FAMILY MEDICINE

## 2019-03-08 PROCEDURE — 81001 URINALYSIS AUTO W/SCOPE: CPT | Performed by: FAMILY MEDICINE

## 2019-03-08 PROCEDURE — 87210 SMEAR WET MOUNT SALINE/INK: CPT | Performed by: FAMILY MEDICINE

## 2019-03-08 RX ORDER — SULFAMETHOXAZOLE/TRIMETHOPRIM 800-160 MG
1 TABLET ORAL 2 TIMES DAILY
Qty: 6 TABLET | Refills: 0 | Status: SHIPPED | OUTPATIENT
Start: 2019-03-08 | End: 2019-03-12

## 2019-03-08 NOTE — PROGRESS NOTES
SUBJECTIVE:   Clrak Crawford is a 23 year old female who presents to clinic today for the following health issues:      URINARY TRACT SYMPTOMS      Duration: past 48 hours    Description  urgency    Intensity:  moderate    Accompanying signs and symptoms:  Fever/chills: no   Flank pain no   Nausea and vomiting: no   Vaginal symptoms: itching  Abdominal/Pelvic Pain: YES    History  History of frequent UTI's: YES  History of kidney stones: no   Sexually Active: YES  Possibility of pregnancy: No    Precipitating or alleviating factors: None    Therapies tried and outcome: none     ROS  No nausea  No fever  No flank pain      EXAM:  BP 90/62   Pulse 60   Temp 98.8  F (37.1  C)   Resp 14   Wt 55.8 kg (123 lb)   SpO2 99%   BMI 21.11 kg/m    Constitutional: Healthy, alert, no distress   Cardiovascular: RRR. No murmurs   Respiratory: Clear to auscultation   Gastrointestinal: Bowel sounds active, no masses, rebound, guarding or organomegally     ASSESSMENT    ICD-10-CM    1. Urinary tract infection symptoms R39.9 UA reflex to Microscopic     Urine Microscopic     sulfamethoxazole-trimethoprim (BACTRIM DS/SEPTRA DS) 800-160 MG tablet   2. Vaginal symptom N94.9 Wet prep      Plan:  Patient Instructions   Drinking fluids       Return in about 1 week (around 3/15/2019), or if symptoms worsen or fail to improve.    Billy Paiz MD  Family Medicine Physician

## 2019-03-09 ENCOUNTER — NURSE TRIAGE (OUTPATIENT)
Dept: NURSING | Facility: CLINIC | Age: 24
End: 2019-03-09

## 2019-03-09 NOTE — TELEPHONE ENCOUNTER
Clark went to MD Billy Paiz  on Friday and started taking antibiotic (Bactrim DS)  and today has a low grade fever.  Clark is calling with questions and concerns about antibiotic.

## 2019-03-12 ENCOUNTER — OFFICE VISIT (OUTPATIENT)
Dept: FAMILY MEDICINE | Facility: CLINIC | Age: 24
End: 2019-03-12
Payer: COMMERCIAL

## 2019-03-12 ENCOUNTER — TELEPHONE (OUTPATIENT)
Dept: FAMILY MEDICINE | Facility: CLINIC | Age: 24
End: 2019-03-12

## 2019-03-12 VITALS
DIASTOLIC BLOOD PRESSURE: 68 MMHG | HEART RATE: 72 BPM | SYSTOLIC BLOOD PRESSURE: 101 MMHG | OXYGEN SATURATION: 98 % | WEIGHT: 121 LBS | BODY MASS INDEX: 20.77 KG/M2 | RESPIRATION RATE: 20 BRPM

## 2019-03-12 DIAGNOSIS — N39.0 URINARY TRACT INFECTION WITHOUT HEMATURIA, SITE UNSPECIFIED: ICD-10-CM

## 2019-03-12 DIAGNOSIS — N76.0 VAGINITIS AND VULVOVAGINITIS: Primary | ICD-10-CM

## 2019-03-12 DIAGNOSIS — N76.0 BACTERIAL VAGINOSIS: ICD-10-CM

## 2019-03-12 DIAGNOSIS — B96.89 BACTERIAL VAGINOSIS: ICD-10-CM

## 2019-03-12 LAB
ALBUMIN UR-MCNC: NEGATIVE MG/DL
APPEARANCE UR: CLEAR
BILIRUB UR QL STRIP: NEGATIVE
COLOR UR AUTO: YELLOW
GLUCOSE UR STRIP-MCNC: NEGATIVE MG/DL
HGB UR QL STRIP: NEGATIVE
KETONES UR STRIP-MCNC: NEGATIVE MG/DL
LEUKOCYTE ESTERASE UR QL STRIP: NEGATIVE
NITRATE UR QL: NEGATIVE
PH UR STRIP: 6 PH (ref 5–7)
SOURCE: NORMAL
SP GR UR STRIP: 1.02 (ref 1–1.03)
SPECIMEN SOURCE: ABNORMAL
UROBILINOGEN UR STRIP-ACNC: 0.2 EU/DL (ref 0.2–1)
WET PREP SPEC: ABNORMAL

## 2019-03-12 PROCEDURE — 87210 SMEAR WET MOUNT SALINE/INK: CPT | Performed by: NURSE PRACTITIONER

## 2019-03-12 PROCEDURE — 99213 OFFICE O/P EST LOW 20 MIN: CPT | Performed by: NURSE PRACTITIONER

## 2019-03-12 PROCEDURE — 81003 URINALYSIS AUTO W/O SCOPE: CPT | Performed by: NURSE PRACTITIONER

## 2019-03-12 RX ORDER — METRONIDAZOLE 500 MG/1
500 TABLET ORAL 2 TIMES DAILY
Qty: 14 TABLET | Refills: 0 | Status: SHIPPED | OUTPATIENT
Start: 2019-03-12 | End: 2019-03-19

## 2019-03-12 RX ORDER — FLUCONAZOLE 150 MG/1
150 TABLET ORAL ONCE
Qty: 1 TABLET | Refills: 0 | Status: SHIPPED | OUTPATIENT
Start: 2019-03-12 | End: 2019-03-12

## 2019-03-12 NOTE — PATIENT INSTRUCTIONS
Patient Education     Bacterial Vaginosis    You have a vaginal infection called bacterial vaginosis (BV). Both good and bad bacteria are present in a healthy vagina. BV occurs when these bacteria get out of balance. The number of bad bacteria increase. And the number of good bacteria decrease. Although BV is associated with sexual activity, it is not a sexually transmitted disease.  BV may or may not cause symptoms. If symptoms do occur, they can include:    Thin, gray, milky-white, or sometimes green discharge    Unpleasant odor or  fishy  smell    Itching, burning, or pain in or around the vagina  It is not known what causes BV, but certain factors can make the problem more likely. This can include:    Douching    Having sex with a new partner    Having sex with more than one partner  BV will sometimes go away on its own. But treatment is usually recommended. This is because untreated BV can increase the risk of more serious health problems such as:    Pelvic inflammatory disease (PID)     delivery (giving birth to a baby early if you re pregnant)    HIV and certain other sexually transmitted diseases (STDs)    Infection after surgery on the reproductive organs  Home care  General care    BV is most often treated with medicines called antibiotics. These may be given as pills or as a vaginal cream. If antibiotics are prescribed, be sure to use them exactly as directed. Also, be sure to complete all of the medicine, even if your symptoms go away.    Don't douche or having sex during treatment.    If you have sex with a female partner, ask your healthcare provider if she should also be treated.  Prevention    Don't douche.    Don't have sex. If you do have sex, then take steps to lower your risk:  ? Use condoms when having sex.  ? Limit the number of sexual partners you have.  Follow-up care  Follow up with your healthcare provider, or as advised.  When to seek medical advice  Call your healthcare provider  right away if:    You have a fever of 100.4 F (38 C) or higher, or as directed by your provider.    Your symptoms worsen, or they don t go away within a few days of starting treatment.    You have new pain in the lower belly or pelvic region.    You have side effects that bother you or a reaction to the pills or cream you re prescribed.    You or any partners you have sex with have new symptoms, such as a rash, joint pain, or sores.  Date Last Reviewed: 10/1/2017    0128-2350 [employer]. [address] [place] [address]. All rights reserved. This information is not intended as a substitute for professional medical care. Always follow your healthcare professional's instructions.         Patient Education     Vaginal Infection: Yeast (Candidiasis)  Yeast infection occurs when yeast in the vagina increase and attacks the vaginal tissues. Yeast is a type of fungus. These infections are often caused by a type of yeast called Candida albicans. Other species of yeast can also cause infections. Factors that may make infection more likely include recent antibiotic use, douching, or increased sex. Yeast infections are more common in women who have diabetes, or are obese or pregnant, or have a weak immune system.  Symptoms of yeast infection    Clumpy or thin, white discharge, which may look like cottage cheese    No odor or minimal odor    Severe vaginal itching or burning    Burning with urination    Swelling, redness of vulva    Pain during sex  Treating yeast infection  Yeast infection is treated with a vaginal antifungal cream. In some cases, antifungal pills are prescribed instead. During treatment:    Finish all of your medicine, even if your symptoms go away.    Apply the cream before going to bed. Lie flat after applying so that it doesn't drip out.    Do not douche or use tampons.    Don't rely on a diaphragm or condoms, since the cream may weaken them.    Avoid intercourse if advised by your  healthcare provider.     Should I treat a yeast infection myself?  Discuss with your healthcare provider whether you should use over-the-counter medicines to treat a yeast infection. Self-treatment may depend on whether:    You've had a yeast infection in the past.    You're at risk for STDs.  Call your healthcare provider if symptoms do not go away or come back after treatment.   Date Last Reviewed: 3/1/2017    7066-0866 [employer]. [address] [place] [address], [place] [address]. All rights reserved. This information is not intended as a substitute for professional medical care. Always follow your healthcare professional's instructions.

## 2019-03-12 NOTE — PROGRESS NOTES
"  SUBJECTIVE:   Clark Crawford is a 23 year old female who presents to clinic today for the following health issues:  Chief Complaint   Patient presents with     Dysuria       Clark was diagnosed with a UTI last week.  She finished her antibiotics yesterday.  She is not sure if her symptoms are completely cleared or not.  She came down with a URI 3 days ago so that is making her feel down right now.  Today no fever.  Urinary frequency is much improved.  She denies blood in her urine.  She is sexually active and denies need for STD testing today.    Vaginal discharge:  Slightly worse right now than usual.  No odor.  Some itching but \"it's not bad.\"    No pelvic pain.  She is eating and drinking normally.     Problem list and histories reviewed & adjusted, as indicated.  Additional history: as documented    Patient Active Problem List   Diagnosis     ZEB (generalized anxiety disorder)     Eating disorder     CARDIOVASCULAR SCREENING; LDL GOAL LESS THAN 160     Past Surgical History:   Procedure Laterality Date     NO HISTORY OF SURGERY         Social History     Tobacco Use     Smoking status: Never Smoker     Smokeless tobacco: Never Used   Substance Use Topics     Alcohol use: Yes     Alcohol/week: 1.8 oz     Types: 3 Standard drinks or equivalent per week     Comment: socially     Family History   Problem Relation Age of Onset     Anesthesia Reaction Mother         nausea     Pancreatic Cancer Mother 47        alive     Anesthesia Reaction Maternal Grandmother         nausea     Depression Maternal Grandmother         past, not currently     Breast Cancer No family hx of      Colon Cancer No family hx of          Current Outpatient Medications   Medication Sig Dispense Refill     ketoconazole (NIZORAL) 2 % cream APPLY TO AFFECTED AREA(S) TWO TIMES A DAY FOR 2 TO 3 WEEKS 60 g 2     magic mouthwash (ENTER INGREDIENTS IN COMMENTS) suspension Swish and spit 5-10 mLs in mouth every 6 hours as needed compound  30 ml " Benadryl (12.5 mg/5 ml), 60 ml Maalox and 30 ml Viscous Lidocaine 120 mL 0     Sertraline HCl (ZOLOFT PO) Take 100 mg by mouth daily       levonorgestrel (MIRENA) 20 MCG/24HR IUD 1 each (20 mcg) by Intrauterine route once for 1 dose 1 each 0     methylPREDNISolone (MEDROL DOSEPAK) 4 MG tablet Follow package instructions (Patient not taking: Reported on 3/12/2019) 21 tablet 0     predniSONE (DELTASONE) 20 MG tablet Take 1 tablet (20 mg) by mouth 2 times daily (Patient not taking: Reported on 3/12/2019.) 10 tablet 0     No Known Allergies  Recent Labs   Lab Test 08/10/16  1356   ALT 20   CR 0.73   GFRESTIMATED >90  Non  GFR Calc     GFRESTBLACK >90   GFR Calc     POTASSIUM 3.8      BP Readings from Last 3 Encounters:   03/12/19 101/68   03/08/19 90/62   11/07/18 98/70    Wt Readings from Last 3 Encounters:   03/12/19 54.9 kg (121 lb)   03/08/19 55.8 kg (123 lb)   11/07/18 53.5 kg (118 lb)                  Labs reviewed in EPIC    Reviewed and updated as needed this visit by clinical staff       Reviewed and updated as needed this visit by Provider         ROS:  Constitutional, HEENT, cardiovascular, pulmonary, GI, , musculoskeletal, neuro, skin, endocrine and psych systems are negative, except as otherwise noted.    OBJECTIVE:     /68   Pulse 72   Resp 20   Wt 54.9 kg (121 lb)   SpO2 98%   BMI 20.77 kg/m    Body mass index is 20.77 kg/m .  GENERAL: healthy, alert, well nourished, well hydrated, no distress  NECK: no tenderness, no adenopathy, supple  RESP: lungs clear to auscultation - no rales, no rhonchi, no wheezes  CV: regular rates and rhythm, normal S1 S2, and no murmur, no click or rub   ABDOMEN: soft, no tenderness, no  hepatosplenomegaly, no masses, normal bowel sounds. No rebound or guarding.  No CVA tenderness  SKIN: warm and dry  .   Diagnostics:  Results for orders placed or performed in visit on 03/12/19   *UA reflex to Microscopic and Culture ([facility] (except [place] and [place])   Result Value Ref [facility]    Color Urine Yellow     Appearance Urine Clear     Glucose Urine Negative NEG^Negative mg/dL    Bilirubin Urine Negative NEG^Negative    Ketones Urine Negative NEG^Negative mg/dL    Specific Gravity Urine 1.025 1.003 - 1.035    Blood Urine Negative NEG^Negative    pH Urine 6.0 5.0 - 7.0 pH    Protein Albumin Urine Negative NEG^Negative mg/dL    Urobilinogen Urine 0.2 0.2 - 1.0 EU/dL    Nitrite Urine Negative NEG^Negative    Leukocyte Esterase Urine Negative NEG^Negative    Source Midstream Urine    Wet prep   Result Value Ref [facility]    Specimen Description Midstream Urine     Wet Prep No Trichomonas seen     Wet Prep Clue cells seen (A)     Wet Prep Yeast seen (A)          ASSESSMENT/PLAN:     (N76.0) Vaginitis and vulvovaginitis  (primary encounter diagnosis)  Comment: acute  Plan: Wet prep, fluconazole (DIFLUCAN) 150 MG tablet        Diflucan x1 prescribed today.  I explained vaginal yeast to the patient.  She will follow up prn, otherwise I anticipate her symptoms will completely clear.     (N76.0,  B96.89) Bacterial vaginosis  Comment: acute  Plan: metroNIDAZOLE (FLAGYL) 500 MG tablet        Take the flagyl/metronidazole as prescribed and abstain from all alcohol.    Anticipate steady resolution of symptoms.   Return to the clinic for a recheck if the sx do not resolve with the therapy or worsen in any way.      (N39.0) Urinary tract infection without hematuria, site unspecified  Comment: cleared  Plan: *UA reflex to Microscopic and Culture ([place]         [facility] (except [place] and         [place])        I reassured the patient today her UA is clear.  She is to push fluids, urinate frequently, wipe front to back etc.  No additional follow-up is necessary unless she develops new symptoms.      VENKATA Hickman [facility]

## 2019-03-20 ENCOUNTER — OFFICE VISIT (OUTPATIENT)
Dept: URGENT CARE | Facility: URGENT CARE | Age: 24
End: 2019-03-20
Payer: COMMERCIAL

## 2019-03-20 VITALS
OXYGEN SATURATION: 100 % | TEMPERATURE: 98.8 F | RESPIRATION RATE: 16 BRPM | SYSTOLIC BLOOD PRESSURE: 90 MMHG | HEART RATE: 58 BPM | DIASTOLIC BLOOD PRESSURE: 64 MMHG

## 2019-03-20 DIAGNOSIS — N76.0 VAGINITIS AND VULVOVAGINITIS: Primary | ICD-10-CM

## 2019-03-20 LAB
ALBUMIN UR-MCNC: NEGATIVE MG/DL
APPEARANCE UR: CLEAR
BILIRUB UR QL STRIP: NEGATIVE
COLOR UR AUTO: YELLOW
GLUCOSE UR STRIP-MCNC: NEGATIVE MG/DL
HGB UR QL STRIP: ABNORMAL
KETONES UR STRIP-MCNC: NEGATIVE MG/DL
LEUKOCYTE ESTERASE UR QL STRIP: NEGATIVE
MUCOUS THREADS #/AREA URNS LPF: PRESENT /LPF
NITRATE UR QL: NEGATIVE
NON-SQ EPI CELLS #/AREA URNS LPF: ABNORMAL /LPF
PH UR STRIP: 6 PH (ref 5–7)
RBC #/AREA URNS AUTO: ABNORMAL /HPF
SOURCE: ABNORMAL
SP GR UR STRIP: 1.02 (ref 1–1.03)
SPECIMEN SOURCE: NORMAL
UROBILINOGEN UR STRIP-ACNC: 0.2 EU/DL (ref 0.2–1)
WBC #/AREA URNS AUTO: ABNORMAL /HPF
WET PREP SPEC: NORMAL

## 2019-03-20 PROCEDURE — 87591 N.GONORRHOEAE DNA AMP PROB: CPT | Performed by: FAMILY MEDICINE

## 2019-03-20 PROCEDURE — 87491 CHLMYD TRACH DNA AMP PROBE: CPT | Performed by: FAMILY MEDICINE

## 2019-03-20 PROCEDURE — 99213 OFFICE O/P EST LOW 20 MIN: CPT | Performed by: FAMILY MEDICINE

## 2019-03-20 PROCEDURE — 87210 SMEAR WET MOUNT SALINE/INK: CPT | Performed by: FAMILY MEDICINE

## 2019-03-20 PROCEDURE — 81001 URINALYSIS AUTO W/SCOPE: CPT | Performed by: FAMILY MEDICINE

## 2019-03-20 RX ORDER — FLUCONAZOLE 150 MG/1
150 TABLET ORAL ONCE
Qty: 1 TABLET | Refills: 0 | Status: SHIPPED | OUTPATIENT
Start: 2019-03-20 | End: 2019-03-20

## 2019-03-20 NOTE — PROGRESS NOTES
SUBJECTIVE:  Clark Crawford is a 23 year old female who presents with vaginal irritation.    Onset of symptoms day(s) ago, stable since.     Pain:none.     Vaginal bleeding: No      Vaginal symptoms: local irritation  No LMP recorded. Patient is not currently having periods (Reason: IUD).    Sexually active: yes    Past Medical History:   Diagnosis Date     Eating disorder      Generalized anxiety disorder      Current Outpatient Medications   Medication Sig Dispense Refill     fluconazole (DIFLUCAN) 150 MG tablet Take 1 tablet (150 mg) by mouth once for 1 dose 1 tablet 0     Sertraline HCl (ZOLOFT PO) Take 100 mg by mouth daily       ketoconazole (NIZORAL) 2 % cream APPLY TO AFFECTED AREA(S) TWO TIMES A DAY FOR 2 TO 3 WEEKS (Patient not taking: Reported on 3/20/2019) 60 g 2     levonorgestrel (MIRENA) 20 MCG/24HR IUD 1 each (20 mcg) by Intrauterine route once for 1 dose 1 each 0     magic mouthwash (ENTER INGREDIENTS IN COMMENTS) suspension Swish and spit 5-10 mLs in mouth every 6 hours as needed compound  30 ml Benadryl (12.5 mg/5 ml), 60 ml Maalox and 30 ml Viscous Lidocaine (Patient not taking: Reported on 3/20/2019) 120 mL 0     methylPREDNISolone (MEDROL DOSEPAK) 4 MG tablet Follow package instructions (Patient not taking: Reported on 3/12/2019) 21 tablet 0     predniSONE (DELTASONE) 20 MG tablet Take 1 tablet (20 mg) by mouth 2 times daily (Patient not taking: Reported on 3/12/2019.) 10 tablet 0     Social History     Socioeconomic History     Marital status: Single     Spouse name: Not on file     Number of children: 0     Years of education: Not on file     Highest education level: Not on file   Occupational History     Occupation: [employer]     Employer: STUDENT     Comment: 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   Social Needs     Financial resource strain: Not on file     Food insecurity:     Worry: Not on file     Inability: Not on file     Transportation needs:     Medical: Not on file      Non-medical: Not on file   Tobacco Use     Smoking status: Never Smoker     Smokeless tobacco: Never Used   Substance and Sexual Activity     Alcohol use: Yes     Alcohol/week: 1.8 oz     Types: 3 Standard drinks or equivalent per week     Comment: socially     Drug use: No     Sexual activity: Yes     Partners: Male     Birth control/protection: IUD   Lifestyle     Physical activity:     Days per week: Not on file     Minutes per session: Not on file     Stress: Not on file   Relationships     Social connections:     Talks on phone: Not on file     Gets together: Not on file     Attends Religion service: Not on file     Active member of club or organization: Not on file     Attends meetings of clubs or organizations: Not on file     Relationship status: Not on file     Intimate partner violence:     Fear of current or ex partner: Not on file     Emotionally abused: Not on file     Physically abused: Not on file     Forced sexual activity: Not on file   Other Topics Concern     Parent/sibling w/ CABG, MI or angioplasty before 65F 55M? Not Asked   Social History Narrative     Not on file       ROS:   CONSTITUTIONAL:NEGATIVE for fever, chills, change in weight  INTEGUMENTARY/SKIN: NEGATIVE for worrisome rashes, moles or lesions    OBJECTIVE:  BP 90/64   Pulse 58   Temp 98.8  F (37.1  C) (Oral)   Resp 16   SpO2 100%   GENERAL APPEARANCE: healthy, alert and no distress  ABDOMEN:  soft, nontender, no HSM or masses and bowel sounds normal  BACK: No CVA tenderness  SKIN: no suspicious lesions or rashes    ASSESSMENT:    ICD-10-CM    1. Vaginitis and vulvovaginitis N76.0 Wet prep     UA with Microscopic reflex to Culture     NEISSERIA GONORRHOEA PCR     CHLAMYDIA TRACHOMATIS PCR     fluconazole (DIFLUCAN) 150 MG tablet

## 2019-04-18 ENCOUNTER — OFFICE VISIT - HEALTHEAST (OUTPATIENT)
Dept: INTERNAL MEDICINE | Facility: CLINIC | Age: 24
End: 2019-04-18

## 2019-04-18 DIAGNOSIS — R30.0 DYSURIA: ICD-10-CM

## 2019-04-18 DIAGNOSIS — N89.8 VAGINAL ITCHING: ICD-10-CM

## 2019-04-18 LAB
ALBUMIN UR-MCNC: NEGATIVE MG/DL
APPEARANCE UR: CLEAR
BILIRUB UR QL STRIP: NEGATIVE
CLUE CELLS: NORMAL
COLOR UR AUTO: YELLOW
GLUCOSE UR STRIP-MCNC: NEGATIVE MG/DL
HGB UR QL STRIP: NEGATIVE
KETONES UR STRIP-MCNC: NEGATIVE MG/DL
LEUKOCYTE ESTERASE UR QL STRIP: NEGATIVE
NITRATE UR QL: NEGATIVE
PH UR STRIP: 6.5 [PH] (ref 5–8)
SP GR UR STRIP: 1.02 (ref 1–1.03)
TRICHOMONAS, WET PREP: NORMAL
UROBILINOGEN UR STRIP-ACNC: NORMAL
YEAST, WET PREP: NORMAL

## 2019-04-18 ASSESSMENT — MIFFLIN-ST. JEOR: SCORE: 1294.49

## 2019-06-24 ENCOUNTER — TRANSFERRED RECORDS (OUTPATIENT)
Dept: HEALTH INFORMATION MANAGEMENT | Facility: CLINIC | Age: 24
End: 2019-06-24

## 2019-06-24 LAB
CHOLESTEROL (EXTERNAL): 152 MG/DL (ref 100–199)
GLUCOSE (EXTERNAL): 76 MG/DL (ref 65–100)
HDLC SERPL-MCNC: 54 MG/DL
LDL CHOLESTEROL CALCULATED (EXTERNAL): 86 MG/DL
NON HDL CHOLESTEROL (EXTERNAL): 98 MG/DL
PAP-ABSTRACT: NORMAL
TRIGLYCERIDES (EXTERNAL): 59 MG/DL

## 2019-09-29 ENCOUNTER — HEALTH MAINTENANCE LETTER (OUTPATIENT)
Age: 24
End: 2019-09-29

## 2019-10-27 ENCOUNTER — HEALTH MAINTENANCE LETTER (OUTPATIENT)
Age: 24
End: 2019-10-27

## 2021-01-14 ENCOUNTER — HEALTH MAINTENANCE LETTER (OUTPATIENT)
Age: 26
End: 2021-01-14

## 2021-04-06 ENCOUNTER — OFFICE VISIT (OUTPATIENT)
Dept: FAMILY MEDICINE | Facility: CLINIC | Age: 26
End: 2021-04-06
Payer: COMMERCIAL

## 2021-04-06 VITALS
HEART RATE: 61 BPM | RESPIRATION RATE: 16 BRPM | DIASTOLIC BLOOD PRESSURE: 62 MMHG | SYSTOLIC BLOOD PRESSURE: 96 MMHG | BODY MASS INDEX: 22.53 KG/M2 | WEIGHT: 132 LBS | OXYGEN SATURATION: 98 % | HEIGHT: 64 IN | TEMPERATURE: 97.8 F

## 2021-04-06 DIAGNOSIS — Z00.00 ROUTINE GENERAL MEDICAL EXAMINATION AT A HEALTH CARE FACILITY: Primary | ICD-10-CM

## 2021-04-06 DIAGNOSIS — Z11.59 NEED FOR HEPATITIS C SCREENING TEST: ICD-10-CM

## 2021-04-06 PROBLEM — F41.1 GAD (GENERALIZED ANXIETY DISORDER): Status: ACTIVE | Noted: 2019-06-24

## 2021-04-06 PROBLEM — F50.9 EATING DISORDER: Status: ACTIVE | Noted: 2019-06-24

## 2021-04-06 LAB — HCV AB SERPL QL IA: NONREACTIVE

## 2021-04-06 PROCEDURE — 86803 HEPATITIS C AB TEST: CPT | Performed by: FAMILY MEDICINE

## 2021-04-06 PROCEDURE — 99395 PREV VISIT EST AGE 18-39: CPT | Performed by: FAMILY MEDICINE

## 2021-04-06 PROCEDURE — 36415 COLL VENOUS BLD VENIPUNCTURE: CPT | Performed by: FAMILY MEDICINE

## 2021-04-06 SDOH — HEALTH STABILITY: MENTAL HEALTH: HOW OFTEN DO YOU HAVE A DRINK CONTAINING ALCOHOL?: 2-3 TIMES A WEEK

## 2021-04-06 SDOH — HEALTH STABILITY: MENTAL HEALTH: HOW MANY STANDARD DRINKS CONTAINING ALCOHOL DO YOU HAVE ON A TYPICAL DAY?: 1 OR 2

## 2021-04-06 SDOH — HEALTH STABILITY: MENTAL HEALTH: HOW OFTEN DO YOU HAVE 6 OR MORE DRINKS ON ONE OCCASION?: NOT ASKED

## 2021-04-06 ASSESSMENT — ENCOUNTER SYMPTOMS
CONSTIPATION: 0
COUGH: 0
FEVER: 0
PALPITATIONS: 0
JOINT SWELLING: 0
DIARRHEA: 0
MYALGIAS: 0
ARTHRALGIAS: 0
SORE THROAT: 0
PARESTHESIAS: 0
BREAST MASS: 0
ABDOMINAL PAIN: 0
WEAKNESS: 0
NAUSEA: 0
DIZZINESS: 0
CHILLS: 0
DYSURIA: 0
HEMATURIA: 0
FREQUENCY: 0
HEADACHES: 0
EYE PAIN: 0
SHORTNESS OF BREATH: 0
HEARTBURN: 0
NERVOUS/ANXIOUS: 0
HEMATOCHEZIA: 0

## 2021-04-06 ASSESSMENT — MIFFLIN-ST. JEOR: SCORE: 1328.75

## 2021-04-06 NOTE — PROGRESS NOTES
SUBJECTIVE:   CC: Clark Crawford is an 25 year old woman who presents for preventive health visit.     Split Bill scripting  The purpose of this visit is to discuss your medical history and prevent health problems before you are sick. You may be responsible for a co-pay, coinsurance, or deductible if your visit today includes services such as checking on a sore throat, having an x-ray or lab test, or treating and evaluating a new or existing condition :[id number]}  Patient has been advised of split billing requirements and indicates understanding: Yes       Healthy Habits:     Getting at least 3 servings of Calcium per day:  Yes    Bi-annual eye exam:  Yes    Dental care twice a year:  NO    Sleep apnea or symptoms of sleep apnea:  Daytime drowsiness    Diet:  Regular (no restrictions)    Frequency of exercise:  4-5 days/week    Duration of exercise:  30-45 minutes    Taking medications regularly:  Yes    Medication side effects:  None    PHQ-2 Total Score: 2    Additional concerns today:  No    Had pap 6/20219, NIL.   Sexually active, on partner, no STI concerns.      Today's PHQ-2 Score:   PHQ-2 ( 1999 Pfizer) 4/6/2021   Q1: Little interest or pleasure in doing things 1   Q2: Feeling down, depressed or hopeless 1   PHQ-2 Score 2   Q1: Little interest or pleasure in doing things Several days   Q2: Feeling down, depressed or hopeless Several days   PHQ-2 Score 2       Abuse: Current or Past (Physical, Sexual or Emotional) - No  Do you feel safe in your environment? Yes    Social History     Tobacco Use     Smoking status: Never Smoker     Smokeless tobacco: Never Used   Substance Use Topics     Alcohol use: Yes     Alcohol/week: 3.0 standard drinks     Types: 3 Standard drinks or equivalent per week     Frequency: 2-3 times a week     Drinks per session: 1 or 2     Comment: socially     If you drink alcohol do you typically have >3 drinks per day or >7 drinks per week? No    Alcohol Use 4/6/2021   Prescreen: >3  drinks/day or >7 drinks/week? No   Prescreen: >3 drinks/day or >7 drinks/week? -   No flowsheet data found.    Breast Cancer Screening:    Breast CA Risk Assessment (FHS-7) 4/6/2021   Do you have a family history of breast, colon, or ovarian cancer? No / Unknown     Patient under 40 years of age: Routine Mammogram Screening not recommended.   Pertinent mammograms are reviewed under the imaging tab.    History of abnormal Pap smear: NO - age 21-29 PAP every 3 years recommended  PAP / HPV 8/10/2016   PAP NIL     Reviewed and updated as needed this visit by clinical staff  Tobacco  Allergies  Meds  Problems  Med Hx  Surg Hx  Fam Hx  Soc Hx          Reviewed and updated as needed this visit by Provider  Tobacco  Allergies  Meds  Problems  Med Hx  Surg Hx  Fam Hx         Past Medical History:   Diagnosis Date     Eating disorder      Generalized anxiety disorder       Past Surgical History:   Procedure Laterality Date     NO HISTORY OF SURGERY         Review of Systems   Constitutional: Negative for chills and fever.   HENT: Negative for congestion, ear pain, hearing loss and sore throat.    Eyes: Negative for pain and visual disturbance.   Respiratory: Negative for cough and shortness of breath.    Cardiovascular: Negative for chest pain, palpitations and peripheral edema.   Gastrointestinal: Negative for abdominal pain, constipation, diarrhea, heartburn, hematochezia and nausea.   Breasts:  Negative for tenderness, breast mass and discharge.   Genitourinary: Negative for dysuria, frequency, genital sores, hematuria, pelvic pain, urgency, vaginal bleeding and vaginal discharge.   Musculoskeletal: Negative for arthralgias, joint swelling and myalgias.   Skin: Negative for rash.   Neurological: Negative for dizziness, weakness, headaches and paresthesias.   Psychiatric/Behavioral: Negative for mood changes. The patient is not nervous/anxious.         OBJECTIVE:   BP 96/62 (BP Location: Right arm, Patient  "Position: Sitting, Cuff Size: Adult Regular)   Pulse 61   Temp 97.8  F (36.6  C) (Tympanic)   Resp 16   Ht 1.626 m (5' 4\")   Wt 59.9 kg (132 lb)   SpO2 98%   BMI 22.66 kg/m    Physical Exam  GENERAL: healthy, alert and no distress  EYES: Eyes grossly normal to inspection, PERRL and conjunctivae and sclerae normal  HENT: ear canals and TM's normal  NECK: no adenopathy, no asymmetry, masses, or scars and thyroid normal to palpation  RESP: lungs clear to auscultation - no rales, rhonchi or wheezes  BREAST: normal without masses, tenderness or nipple discharge and no palpable axillary masses or adenopathy  CV: regular rate and rhythm, normal S1 S2, no S3 or S4, no murmur, click or rub, no peripheral edema and peripheral pulses strong  ABDOMEN: soft, nontender, no hepatosplenomegaly, no masses and bowel sounds normal  MS: no gross musculoskeletal defects noted, no edema  SKIN: no suspicious lesions or rashes  NEURO: Normal strength and tone, mentation intact and speech normal  PSYCH: mentation appears normal, affect normal/bright    ASSESSMENT/PLAN:   lCark was seen today for physical.    Diagnoses and all orders for this visit:    Routine general medical examination at a health care facility    Need for hepatitis C screening test  -     Hepatitis C Screen Reflex to HCV RNA Quant and Genotype    COUNSELING:  Reviewed preventive health counseling, as reflected in patient instructions       Regular exercise       Healthy diet/nutrition       Contraception       Consider Hep C screening for all patients one time for ages 18-79 years    Estimated body mass index is 22.66 kg/m  as calculated from the following:    Height as of this encounter: 1.626 m (5' 4\").    Weight as of this encounter: 59.9 kg (132 lb).    She reports that she has never smoked. She has never used smokeless tobacco.      Counseling Resources:  ATP IV Guidelines  Pooled Cohorts Equation Calculator  Breast Cancer Risk Calculator  BRCA-Related Cancer " Risk Assessment: FHS-7 Tool  FRAX Risk Assessment  ICSI Preventive Guidelines  Dietary Guidelines for Americans, 2010  USDA's MyPlate  ASA Prophylaxis  Lung CA Screening    Sal Alonso DO  [facility]

## 2021-05-27 NOTE — PATIENT INSTRUCTIONS - HE
Maintain adequate hydration. Seek medical attention if not improving after 2-3 days, develop fever, develop flank pain or other concerning symptom.

## 2021-06-03 VITALS — WEIGHT: 123.8 LBS | HEIGHT: 65 IN | BODY MASS INDEX: 20.62 KG/M2

## 2021-07-08 ENCOUNTER — OFFICE VISIT (OUTPATIENT)
Dept: FAMILY MEDICINE | Facility: CLINIC | Age: 26
End: 2021-07-08
Payer: COMMERCIAL

## 2021-07-08 VITALS
HEART RATE: 72 BPM | TEMPERATURE: 97.9 F | RESPIRATION RATE: 16 BRPM | BODY MASS INDEX: 22.45 KG/M2 | WEIGHT: 131.5 LBS | HEIGHT: 64 IN | DIASTOLIC BLOOD PRESSURE: 62 MMHG | OXYGEN SATURATION: 100 % | SYSTOLIC BLOOD PRESSURE: 102 MMHG

## 2021-07-08 DIAGNOSIS — H60.391 INFECTIVE OTITIS EXTERNA, RIGHT: ICD-10-CM

## 2021-07-08 DIAGNOSIS — H65.01 NON-RECURRENT ACUTE SEROUS OTITIS MEDIA OF RIGHT EAR: Primary | ICD-10-CM

## 2021-07-08 PROCEDURE — 99214 OFFICE O/P EST MOD 30 MIN: CPT | Performed by: FAMILY MEDICINE

## 2021-07-08 RX ORDER — FLUCONAZOLE 150 MG/1
150 TABLET ORAL ONCE
Qty: 1 TABLET | Refills: 0 | Status: SHIPPED | OUTPATIENT
Start: 2021-07-08 | End: 2021-07-08

## 2021-07-08 RX ORDER — OFLOXACIN 3 MG/ML
5 SOLUTION AURICULAR (OTIC) DAILY
Qty: 5 ML | Refills: 0 | Status: SHIPPED | OUTPATIENT
Start: 2021-07-08

## 2021-07-08 ASSESSMENT — MIFFLIN-ST. JEOR: SCORE: 1321.48

## 2021-07-08 ASSESSMENT — ENCOUNTER SYMPTOMS: FEVER: 0

## 2021-07-08 NOTE — PATIENT INSTRUCTIONS
Patient Education     External Ear Infection (Adult)    External otitis (also called  swimmer s ear ) is an infection in the ear canal. It's often caused by bacteria or fungus. It can occur a few days after water gets trapped in the ear canal (from swimming or bathing). It can also occur after cleaning too deeply in the ear canal with a cotton swab or other object. Sometimes, hair care products get into the ear canal and cause this problem.   Symptoms can include pain, fever, itching, redness, drainage, or swelling of the ear canal. Temporary hearing loss may also occur.   Home care    Don't try to clean the ear canal. This can push pus and bacteria deeper into the canal.    Use prescribed ear drops as directed. These help reduce swelling and fight the infection. If an ear wick was placed in the ear canal, apply drops right onto the end of the wick. The wick will draw the medicine into the ear canal even if it's swollen closed.    A cotton ball may be loosely placed in the outer ear to absorb any drainage.    You may use over-the-counter medicines to control pain as directed by the healthcare provider, unless another medicine was prescribed. Talk with your provider before using these medicines if you have chronic liver or kidney disease or ever had a stomach ulcer or digestive tract bleeding.    Don't allow water to get into your ear when bathing. Also don't swim until the infection has cleared.    Prevention    Keep your ears dry. This helps lower the risk of infection. Dry your ears with a towel or hair dryer after getting wet. Also, use ear plugs when swimming.    Don't stick any objects in the ear to remove wax.    Talk with your provider about using ear drops to prevent swimmer's ear in case you feel water trapped in your ear canal. You can get these drops over the counter at most drugstores. They work by removing water from the ear canal.    Follow-up care  Follow up with your healthcare provider in 1 week, or  as advised.   When to seek medical advice  Call your healthcare provider right away if any of these occur:     Ear pain becomes worse or doesn t improve after 3 days of treatment    Redness or swelling of the outer ear occurs or gets worse    Headache    Fever of 100.4 F (38 C) or higher, or as directed by your healthcare provider  Call 911  Call 911 or get immediate medical care if any of the following occur:     Seizure    Unusual drowsiness or confusion    Unusual painful or stiff neck    Rock[employer] last reviewed this educational content on 8/1/2020 2000-2021 [employer]. All rights reserved. This information is not intended as a substitute for professional medical care. Always follow your healthcare professional's instructions.

## 2021-07-08 NOTE — PROGRESS NOTES
"    Assessment & Plan     Non-recurrent acute serous otitis media of right ear  Acute problem.  No evidence of mastoiditis.  Start oral Augmentin.  We will also print a prescription of Diflucan for her as needed for antibiotic related yeast infections which she has had in the past.  - amoxicillin-clavulanate (AUGMENTIN) 875-125 MG tablet  Dispense: 10 tablet; Refill: 0  - fluconazole (DIFLUCAN) 150 MG tablet  Dispense: 1 tablet; Refill: 0    Infective otitis externa, right  Acute problem.  Start topical ofloxacin  - ofloxacin (FLOXIN) 0.3 % otic solution  Dispense: 5 mL; Refill: 0        Return in about 1 week (around 7/15/2021) for If symptoms do not improve or gets worse..    Trevor Ramos MD  [facility] is a 26 year old who presents for the following health issues     HPI     Acute Illness  Acute illness concerns: right ear pain  Onset/Duration: Tuesday night -  Was swimming the whole weekend  Symptoms:  Fever: no  Chills/Sweats: no  Headache (location?): no  Sinus Pressure: no  Conjunctivitis:  no  Ear Pain: YES: right  Rhinorrhea: YES  Congestion: YES  Sore Throat: no  Cough: no  Wheeze: no  Decreased Appetite: YES  Nausea: no  Vomiting: no  Diarrhea: YES only because she runs   Dysuria/Freq.: no  Dysuria or Hematuria: no  Fatigue/Achiness: no  Sick/Strep Exposure: no  Therapies tried and outcome:       Review of Systems   Constitutional: Negative for fever.            Objective    /62 (BP Location: Right arm, Patient Position: Chair, Cuff Size: Adult Regular)   Pulse 72   Temp 97.9  F (36.6  C) (Oral)   Resp 16   Ht 1.626 m (5' 4\")   Wt 59.6 kg (131 lb 8 oz)   SpO2 100%   BMI 22.57 kg/m    Body mass index is 22.57 kg/m .  Physical Exam  HENT:      Left Ear: Tympanic membrane normal.      Ears:      Comments: The right tympanic membrane is erythematous and bulging.    Reported tenderness with insertion of the right otoscope, the external right ear canal " is erythematous    Bilateral mastoids are normal    No periauricular edema or lymphadenopathy     Nose: Rhinorrhea present.      Comments: Rightward septal sinus deviation

## 2021-10-23 ENCOUNTER — HEALTH MAINTENANCE LETTER (OUTPATIENT)
Age: 26
End: 2021-10-23

## 2022-06-04 ENCOUNTER — HEALTH MAINTENANCE LETTER (OUTPATIENT)
Age: 27
End: 2022-06-04

## 2022-10-10 ENCOUNTER — HEALTH MAINTENANCE LETTER (OUTPATIENT)
Age: 27
End: 2022-10-10

## 2023-05-27 ENCOUNTER — HEALTH MAINTENANCE LETTER (OUTPATIENT)
Age: 28
End: 2023-05-27